# Patient Record
Sex: MALE | Race: WHITE | Employment: FULL TIME | URBAN - METROPOLITAN AREA
[De-identification: names, ages, dates, MRNs, and addresses within clinical notes are randomized per-mention and may not be internally consistent; named-entity substitution may affect disease eponyms.]

---

## 2022-05-06 ENCOUNTER — OFFICE VISIT (OUTPATIENT)
Dept: ORTHOPEDIC SURGERY | Age: 57
End: 2022-05-06
Payer: COMMERCIAL

## 2022-05-06 VITALS — BODY MASS INDEX: 34.44 KG/M2 | WEIGHT: 246 LBS | HEIGHT: 71 IN

## 2022-05-06 DIAGNOSIS — M17.11 PRIMARY OSTEOARTHRITIS OF RIGHT KNEE: Primary | ICD-10-CM

## 2022-05-06 PROCEDURE — 20610 DRAIN/INJ JOINT/BURSA W/O US: CPT | Performed by: PHYSICIAN ASSISTANT

## 2022-05-06 RX ORDER — LIDOCAINE HYDROCHLORIDE 10 MG/ML
8 INJECTION, SOLUTION INFILTRATION; PERINEURAL ONCE
Status: COMPLETED | OUTPATIENT
Start: 2022-05-06 | End: 2022-05-06

## 2022-05-06 RX ORDER — TRIAMCINOLONE ACETONIDE 40 MG/ML
80 INJECTION, SUSPENSION INTRA-ARTICULAR; INTRAMUSCULAR ONCE
Status: COMPLETED | OUTPATIENT
Start: 2022-05-06 | End: 2022-05-06

## 2022-05-06 RX ORDER — IBUPROFEN 800 MG/1
800 TABLET ORAL 2 TIMES DAILY PRN
Qty: 60 TABLET | Refills: 0 | Status: SHIPPED | OUTPATIENT
Start: 2022-05-06 | End: 2022-06-27 | Stop reason: SDUPTHER

## 2022-05-06 RX ORDER — IBUPROFEN 200 MG
200 TABLET ORAL EVERY 6 HOURS PRN
COMMUNITY
End: 2022-05-06

## 2022-05-06 RX ADMIN — LIDOCAINE HYDROCHLORIDE 8 ML: 10 INJECTION, SOLUTION INFILTRATION; PERINEURAL at 10:52

## 2022-05-06 RX ADMIN — TRIAMCINOLONE ACETONIDE 80 MG: 40 INJECTION, SUSPENSION INTRA-ARTICULAR; INTRAMUSCULAR at 10:53

## 2022-05-06 ASSESSMENT — ENCOUNTER SYMPTOMS: RESPIRATORY NEGATIVE: 1

## 2022-05-06 NOTE — PROGRESS NOTES
Julia Mcdonough (:  1965) is a 49 y.o. male,Established patient, here for evaluation of the following chief complaint(s):  Knee Pain (The patient c/o chronic right knee pain. The patient is a former patient at [facility]. He was given cortisone injections which he states that has helped with his pain. He rates his knee pain as a 6/10 today.  )         ASSESSMENT/PLAN:  1. Primary osteoarthritis of right knee  -     [place] ARTHROCENTESIS ASPIR&/INJ MAJOR JT/BURSA W/O US      Return for Viscolubricant injection. Subjective   SUBJECTIVE/OBJECTIVE:  This is a 24-year-old male with complaint of right knee pain. I have treated him previously with cortisone as well as Visco lubricant. Complains of inner aspect right knee pain. Been diagnosed previously with medial compartment osteoarthritis. He is not ready for joint replacement at this time. Presents today requesting intra-articular cortisone injection in the right knee. Review of Systems   Constitutional: Negative. HENT: Negative. Respiratory: Negative. Skin: Negative. Neurological: Negative. Objective      Right knee x-rays performed 2021 and available in the [facility] health record showed medial compartment degenerative arthritis. Physical Exam  Musculoskeletal:      Comments: Right knee-joint effusion present. Popliteal cyst present. No warmth, erythema, fluctuance or sign of infection. Full extension, flexion to 120 degrees. The knee joint is stable, there is no laxity with valgus or varus stress. Tenderness with palpation to medial femoral condyle. Patellofemoral crepitus with range of motion. Calf is soft and nontender.      Time Out  [x] Patient Verified  [x] Site Verified  [x] Laterality Verified  [x] Procedure Verified    Before aspiration/injection risks/benefits of a cortisone injection including infection, local skin irritation, skin atrophy, continued pain/discomfort, elevated blood sugar, burning, failure to relieve pain, possible late infection were discussed with patient. Patient verbalized understanding and wanted to proceed with planned procedure. After prepping and draping the right knee in the usual sterile fashion, 2 cc of 40 mg/ml kenalog with 8 cc of 1% lidocaine were injected intra-articularly without any complications. Patient tolerated this well. Post-procedure discomfort can be alleviated with additional medications/ice/elevation/rest over the first 24 hours as recommended. The patient tolerated the procedure well. If fluid was aspirated it was sent for further studies  in sterile specimen container as ordered to the lab    Explained to the patient he has arthritis in the medial compartment of his right knee. We discussed joint replacement and he is not ready to proceed with joint replacement surgery at this time because he is only 49years old. I injected the knee today with cortisone. We will request Visco lubricant from his insurance company. He has noticed significant improvement with Visco lubricant previously. He has been taking ibuprofen 800 mg tablets and needs a refill. We did discuss topical anti-inflammatory creams. He is actively participating in a home exercise program.  He is wearing a knee sleeve. He is working on losing weight. Rationale for Viscosupplementation: Renewal Request   As a part of a multimodal treatment plan, we are requesting authorization of hyaluronic acid viscosupplementation injections for the improvement of symptoms related to osteoarthritis. Authorization is being requested for treatment of the Left knee. Rationale for authorization of these injections is based on the following elements:     Signs and Symptoms   Length of symptoms > 3 months   Pain interferes with ADLs? Yes   Radiographic evidence of OA? Yes     Previous Treatments   Bracing attempted? No   Formal PT attempted? Yes   NSAID medication attempted?  Yes Corticosteroid injection attempted? Yes   Weight management attempted? No     Prior Viscosupplementation   Prior viscosupplementation? Yes   Prior viscosupplementation improved  symptoms? Yes   Prior viscosupplementation improved  symptoms at least 6 months? Yes       Requested Viscosupplementation   Preferred product: Synvisc  Alternative product: Synvisc One or payor preferred    On this date 5/6/2022 I have spent 35 minutes reviewing previous notes, test results and face to face with the patient discussing the diagnosis and importance of compliance with the treatment plan as well as documenting on the day of the visit. An electronic signature was used to authenticate this note.     --VIVIAN Beckham

## 2022-05-06 NOTE — PATIENT INSTRUCTIONS
Patient Education        Learning About Joint Injections  What are joint injections? Joint injections are shots into a joint, such as the knee or shoulder. They are used to put in medicines, such as pain relievers and steroid medicines. Steroids can help reduce inflammation. A steroid shot can sometimes help withshort-term pain relief when other treatments haven't worked. How are they done? First, the area over the joint will be cleaned. Your doctor may then use a tinyneedle to numb the skin in the area where you will get the joint injection. If a tiny needle is used to numb the area, your doctor will use another needle to inject the medicine. Your doctor may use a pain reliever, a steroid, orboth. You may feel some pressure or discomfort. Your doctor may put ice on the area before you go home. What can you expect after a joint injection? You will probably go home soon after your shot. You may have numbness aroundthe joint for a few hours. If your shot included both a pain reliever and a steroid, then the pain will probably go away right away. But it might come back after a few hours. This might happen if the pain reliever wears off and the steroid hasn't started to work yet. Steroids don't always work. But when they do, the pain relief canlast for several days to a few months or longer. Your doctor may tell you to use ice on the area. You can also use ice if the pain comes back. Put ice or a cold pack on your joint for 10 to 20 minutes at atime. Put a thin cloth between the ice and your skin. Follow your doctor's instructions carefully. Follow-up care is a key part of your treatment and safety. Be sure to make and go to all appointments, and call your doctor if you are having problems. It's also a good idea to know your test results and keep alist of the medicines you take. Where can you learn more? Go to https://ramo.ShareYourCart. org and sign in to your Food52 account.  Enter O811 in the Search Health Information box to learn more about \"Learning About Joint Injections. \"     If you do not have an account, please click on the \"Sign Up Now\" link. Current as of: July 1, 2021               Content Version: 13.2  © 2006-2022 SunPods. Care instructions adapted under license by [facility] ([facility]). If you have questions about a medical condition or this instruction, always ask your healthcare professional. [place] [address] any warranty or liability for your use of this information. Patient Education        Knee Arthritis: Care Instructions  Your Care Instructions     Knee arthritis is a breakdown of the cartilage that cushions your knee joint. When the cartilage wears down, your bones rub against each other. This causespain and stiffness. Knee arthritis tends to get worse with time. Treatment for knee arthritis involves reducing pain, making the leg muscles stronger, and staying at a healthy body weight. The treatment usually does not improve the health of the cartilage, but it can reduce pain and improve howwell your knee works. You can take simple measures to protect your knee joints, ease your pain, andhelp you stay active. Follow-up care is a key part of your treatment and safety. Be sure to make and go to all appointments, and call your doctor if you are having problems. It's also a good idea to know your test results and keep alist of the medicines you take. How can you care for yourself at home?  Know that knee arthritis will cause more pain on some days than on others.  Stay at a healthy weight. Lose weight if you are overweight. When you stand up, the pressure on your knees from every pound of body weight is multiplied four times. So if you lose 10 pounds, you will reduce the pressure on your knees by 40 pounds.  Talk to your doctor or physical therapist about exercises that will help ease joint pain.   ? Stretch to help prevent stiffness and to prevent injury before you exercise. You may enjoy gentle forms of yoga to help keep your knee joints and muscles flexible. ? Walk instead of jog.  ? Ride a bike. This makes your thigh muscles stronger and takes pressure off your knee. ? Wear well-fitting and comfortable shoes. ? Exercise in chest-deep water. This can help you exercise longer with less pain. ? Avoid exercises that include squatting or kneeling. They can put a lot of strain on your knees. ? Talk to your doctor to make sure that the exercise you do is not making the arthritis worse.  Do not sit for long periods of time. Try to walk once in a while to keep your knee from getting stiff.  Ask your doctor or physical therapist whether shoe inserts may reduce your arthritis pain.  If you can afford it, get new athletic shoes at least every year. This can help reduce the strain on your knees.  Use a device to help you do everyday activities. ? A cane or walking stick can help you keep your balance when you walk. Hold the cane or walking stick in the hand opposite the painful knee. ? If you feel like you may fall when you walk, try using crutches or a front-wheeled walker. These can prevent falls that could cause more damage to your knee. ? A knee brace may help keep your knee stable and prevent pain. ? You also can use other things to make life easier, such as a higher toilet seat and handrails in the bathtub or shower.  Take pain medicines exactly as directed. ? Do not wait until you are in severe pain. You will get better results if you take it sooner. ? If you are not taking a prescription pain medicine, take an over-the-counter medicine such as acetaminophen (Tylenol), ibuprofen (Advil, Motrin), or naproxen (Aleve). Read and follow all instructions on the label. ? Do not take two or more pain medicines at the same time unless the doctor told you to. Many pain medicines have acetaminophen, which is Tylenol.  Too much acetaminophen (Tylenol) can be harmful. ? Tell your doctor if you take a blood thinner, have diabetes, or have allergies to shellfish.  Ask your doctor if you might benefit from a shot of steroid medicine into your knee. This may provide pain relief for several months.  Many people take the supplements glucosamine and chondroitin for osteoarthritis. Some people feel they help, but the medical research does not show that they work. Talk to your doctor before you take these supplements. When should you call for help? Call your doctor now or seek immediate medical care if:     You have sudden swelling, warmth, or pain in your knee.      You have knee pain and a fever or rash.      You have such bad pain that you cannot use your knee. Watch closely for changes in your health, and be sure to contact your doctor ifyou have any problems. Where can you learn more? Go to https://Service Management Groupzacheb.Affinity Networks. org and sign in to your DyMynd account. Enter F321 in the SolarCity box to learn more about \"Knee Arthritis: Care Instructions. \"     If you do not have an account, please click on the \"Sign Up Now\" link. Current as of: December 20, 2021               Content Version: 13.2  © 2006-2022 Ometrics. Care instructions adapted under license by [facility] ([facility]). If you have questions about a medical condition or this instruction, always ask your healthcare professional. [address] any warranty or liability for your use of this information. Patient Education        Knee Arthritis: Exercises  Introduction  Here are some examples of exercises for you to try. The exercises may be suggested for a condition or for rehabilitation. Start each exercise slowly. Ease off the exercises if you start to have pain. You will be told when to start these exercises and which ones will work bestfor you. How to do the exercises  Knee flexion with heel slide    1.  Lie on your back with your knees bent. 2. Slide your heel back by bending your affected knee as far as you can. Then hook your other foot around your ankle to help pull your heel even farther back. 3. Hold for about 6 seconds, then rest for up to 10 seconds. 4. Repeat 8 to 12 times. 5. Switch legs and repeat steps 1 through 4, even if only one knee is sore. Quad sets    1. Sit with your affected leg straight and supported on the floor or a firm bed. Place a small, rolled-up towel under your knee. Your other leg should be bent, with that foot flat on the floor. 2. Tighten the thigh muscles of your affected leg by pressing the back of your knee down into the towel. 3. Hold for about 6 seconds, then rest for up to 10 seconds. 4. Repeat 8 to 12 times. 5. Switch legs and repeat steps 1 through 4, even if only one knee is sore. Straight-leg raises to the front    1. Lie on your back with your good knee bent so that your foot rests flat on the floor. Your affected leg should be straight. Make sure that your low back has a normal curve. You should be able to slip your hand in between the floor and the small of your back, with your palm touching the floor and your back touching the back of your hand. 2. Tighten the thigh muscles in your affected leg by pressing the back of your knee flat down to the floor. Hold your knee straight. 3. Keeping the thigh muscles tight and your leg straight, lift your affected leg up so that your heel is about 12 inches off the floor. Hold for about 6 seconds, then lower slowly. 4. Relax for up to 10 seconds between repetitions. 5. Repeat 8 to 12 times. 6. Switch legs and repeat steps 1 through 5, even if only one knee is sore. Active knee flexion    1. Lie on your stomach with your knees straight. If your kneecap is uncomfortable, roll up a washcloth and put it under your leg just above your kneecap.   2. Lift the foot of your affected leg by bending the knee so that you bring the foot up toward your buttock. If this motion hurts, try it without bending your knee quite as far. This may help you avoid any painful motion. 3. Slowly move your leg up and down. 4. Repeat 8 to 12 times. 5. Switch legs and repeat steps 1 through 4, even if only one knee is sore. Quadriceps stretch (facedown)    1. Lie flat on your stomach, and rest your face on the floor. 2. Wrap a towel or belt strap around the lower part of your affected leg. Then use the towel or belt strap to slowly pull your heel toward your buttock until you feel a stretch. 3. Hold for about 15 to 30 seconds, then relax your leg against the towel or belt strap. 4. Repeat 2 to 4 times. 5. Switch legs and repeat steps 1 through 4, even if only one knee is sore. Stationary exercise bike    1. If you do not have a stationary exercise bike at home, you can find one to ride at your local health club or community center. 2. Adjust the height of the bike seat so that your knee is slightly bent when your leg is extended downward. If your knee hurts when the pedal reaches the top, you can raise the seat so that your knee does not bend as much. 3. Start slowly. At first, try to do 5 to 10 minutes of cycling with little to no resistance. Then increase your time and the resistance bit by bit until you can do 20 to 30 minutes without pain. 4. If you start to have pain, rest your knee until your pain gets back to the level that is normal for you. Or cycle for less time or with less effort. Follow-up care is a key part of your treatment and safety. Be sure to make and go to all appointments, and call your doctor if you are having problems. It's also a good idea to know your test results and keep alist of the medicines you take. Where can you learn more? Go to https://TBT GroupzachDandelion.Upper Krust Pizza. org and sign in to your 4th aspect account. Enter C159 in the LiveLeaf box to learn more about \"Knee Arthritis: Exercises. \"     If you do not have an account, please click on the \"Sign Up Now\" link. Current as of: July 1, 2021               Content Version: 13.2  © 2006-2022 Healthwise, Incorporated. Care instructions adapted under license by [facility] ([facility]). If you have questions about a medical condition or this instruction, always ask your healthcare professional. [address] any warranty or liability for your use of this information.

## 2022-05-06 NOTE — PROGRESS NOTES
Patient's name, date of birth, and allergies have been confirmed. Patient is aware that injection is to be given in Right knee. He/she is aware that they will be seeing [facility] and the injection will be given by him.

## 2022-06-24 ENCOUNTER — TELEPHONE (OUTPATIENT)
Dept: ORTHOPEDIC SURGERY | Age: 57
End: 2022-06-24

## 2022-06-24 NOTE — TELEPHONE ENCOUNTER
Lisa Moody 1965 called and would like a refill of ibuprofen 800mg. [facility]  Thank you

## 2022-06-27 RX ORDER — IBUPROFEN 800 MG/1
800 TABLET ORAL 2 TIMES DAILY PRN
Qty: 60 TABLET | Refills: 0 | Status: SHIPPED | OUTPATIENT
Start: 2022-06-27 | End: 2022-10-31

## 2022-10-11 ENCOUNTER — OFFICE VISIT (OUTPATIENT)
Dept: ORTHOPEDIC SURGERY | Age: 57
End: 2022-10-11
Payer: COMMERCIAL

## 2022-10-11 VITALS
HEART RATE: 87 BPM | WEIGHT: 242 LBS | OXYGEN SATURATION: 98 % | BODY MASS INDEX: 33.88 KG/M2 | TEMPERATURE: 97.8 F | HEIGHT: 71 IN

## 2022-10-11 DIAGNOSIS — M17.11 PRIMARY OSTEOARTHRITIS OF RIGHT KNEE: Primary | ICD-10-CM

## 2022-10-11 PROCEDURE — 20610 DRAIN/INJ JOINT/BURSA W/O US: CPT | Performed by: PHYSICIAN ASSISTANT

## 2022-10-12 RX ORDER — TRIAMCINOLONE ACETONIDE 40 MG/ML
80 INJECTION, SUSPENSION INTRA-ARTICULAR; INTRAMUSCULAR ONCE
Status: COMPLETED | OUTPATIENT
Start: 2022-10-12 | End: 2022-10-12

## 2022-10-12 RX ORDER — LIDOCAINE HYDROCHLORIDE 10 MG/ML
8 INJECTION, SOLUTION INFILTRATION; PERINEURAL ONCE
Status: COMPLETED | OUTPATIENT
Start: 2022-10-12 | End: 2022-10-12

## 2022-10-12 RX ADMIN — TRIAMCINOLONE ACETONIDE 80 MG: 40 INJECTION, SUSPENSION INTRA-ARTICULAR; INTRAMUSCULAR at 14:15

## 2022-10-12 RX ADMIN — LIDOCAINE HYDROCHLORIDE 8 ML: 10 INJECTION, SOLUTION INFILTRATION; PERINEURAL at 14:15

## 2022-10-12 ASSESSMENT — ENCOUNTER SYMPTOMS: RESPIRATORY NEGATIVE: 1

## 2022-10-12 NOTE — PROGRESS NOTES
Jayson Lane (:  1965) is a 49 y.o. male,Established patient, here for evaluation of the following chief complaint(s):  Follow-up (Right knee injection )         ASSESSMENT/PLAN:  1. Primary osteoarthritis of right knee  -     [place] ARTHROCENTESIS ASPIR&/INJ MAJOR JT/BURSA W/O US    No follow-ups on file. Subjective   SUBJECTIVE/OBJECTIVE:  This is a 57-year-old male with complaint of right knee pain. Of injected his knee previously with cortisone as well as Visco lubricant. He presents today requesting intra-articular cortisone injection of the right knee. He does not want to proceed with joint replacement at this time. Review of Systems   Constitutional: Negative. HENT: Negative. Respiratory: Negative. Skin: Negative. Neurological: Negative. Objective     Right knee x-rays performed 2021 and available in the [facility] health record showed medial compartment degenerative arthritis. Physical Exam  Musculoskeletal:      Comments: Right knee-joint effusion present. Popliteal cyst present. No warmth, erythema, fluctuance or sign of infection. Full extension, flexion to 120 degrees. Knee joint is stable, there is no laxity with valgus or varus stress. Tenderness with palpation to medial femoral condyle. Patellofemoral crepitus with range of motion. Calf is soft and nontender. Time Out  [x] Patient Verified  [x] Site Verified  [x] Laterality Verified  [x] Procedure Verified    Before aspiration/injection risks/benefits of a cortisone  injection including infection, local skin irritation, skin atrophy, continued pain/discomfort, elevated blood sugar, burning, failure to relieve pain, possible late infection were discussed with patient. Patient verbalized understanding and wanted to proceed with planned procedure.     After prepping and draping the right knee in the usual sterile fashion, 2cc 40mg Kenalog and 8cc Lidocaine were injected intra-articularly after aspirating 14 cc clear yellow joint fluid without any complications. Patient tolerated this well. Post-procedure discomfort can be alleviated with additional medications/ice/elevation/rest over the first 24 hours as recommended. The patient tolerated the procedure well. If fluid was aspirated that was abnormal it was sent for further studies  in sterile specimen container as ordered to the lab     Diagnosis Orders   1. Primary osteoarthritis of right knee  [place] ARTHROCENTESIS ASPIR&/INJ MAJOR JT/BURSA W/O US      Explained to the patient he does have degenerative arthritis of the right knee. Of injected the right knee previously with cortisone as well as Synvisc 1. Patient has noticed significant improvement with both cortisone and Synvisc 1. He does not want to proceed with joint replacement at this time but will entertain that in the future. We will request Visco lubricant from his insurance company and contact him when it is approved. Rationale for Viscosupplementation: Renewal Request   As a part of a multimodal treatment plan, we are requesting authorization of hyaluronic acid viscosupplementation injections for the improvement of symptoms related to osteoarthritis. Authorization is being requested for treatment of the right knee. Rationale for authorization of these injections is based on the following elements:     Signs and Symptoms   Length of symptoms > 3 months   Pain interferes with ADLs? Yes   Radiographic evidence of OA? Yes     Previous Treatments   Bracing attempted? Yes   Formal PT attempted? Yes   NSAID medication attempted? Yes   Corticosteroid injection attempted? Yes   Weight management attempted? Yes     Prior Viscosupplementation   Prior viscosupplementation? Yes   Prior viscosupplementation improved  symptoms? Yes  Prior viscosupplementation improved  symptoms at least 6 months?  Yes      Requested Viscosupplementation   Preferred product: Synvisc 1  Alternative product: Durolane or payor preferred     On this date 10/11/2022 I have spent 35 minutes reviewing previous notes, test results and face to face with the patient discussing the diagnosis and importance of compliance with the treatment plan as well as documenting on the day of the visit. An electronic signature was used to authenticate this note.     --VIVIAN Edwards

## 2022-10-31 RX ORDER — IBUPROFEN 800 MG/1
TABLET ORAL
Qty: 60 TABLET | Refills: 0 | Status: SHIPPED | OUTPATIENT
Start: 2022-10-31

## 2023-03-15 ENCOUNTER — PROCEDURE VISIT (OUTPATIENT)
Dept: ORTHOPEDIC SURGERY | Age: 58
End: 2023-03-15
Payer: COMMERCIAL

## 2023-03-15 VITALS
HEIGHT: 71 IN | BODY MASS INDEX: 33.88 KG/M2 | TEMPERATURE: 98.4 F | OXYGEN SATURATION: 96 % | WEIGHT: 242 LBS | HEART RATE: 77 BPM

## 2023-03-15 DIAGNOSIS — M17.11 PRIMARY OSTEOARTHRITIS OF RIGHT KNEE: Primary | ICD-10-CM

## 2023-03-15 PROCEDURE — 20610 DRAIN/INJ JOINT/BURSA W/O US: CPT | Performed by: PHYSICIAN ASSISTANT

## 2023-03-15 PROCEDURE — G8484 FLU IMMUNIZE NO ADMIN: HCPCS | Performed by: PHYSICIAN ASSISTANT

## 2023-03-15 PROCEDURE — 1036F TOBACCO NON-USER: CPT | Performed by: PHYSICIAN ASSISTANT

## 2023-03-15 PROCEDURE — G8417 CALC BMI ABV UP PARAM F/U: HCPCS | Performed by: PHYSICIAN ASSISTANT

## 2023-03-15 PROCEDURE — 99214 OFFICE O/P EST MOD 30 MIN: CPT | Performed by: PHYSICIAN ASSISTANT

## 2023-03-15 PROCEDURE — 3017F COLORECTAL CA SCREEN DOC REV: CPT | Performed by: PHYSICIAN ASSISTANT

## 2023-03-15 PROCEDURE — G8427 DOCREV CUR MEDS BY ELIG CLIN: HCPCS | Performed by: PHYSICIAN ASSISTANT

## 2023-03-15 RX ORDER — LIDOCAINE HYDROCHLORIDE 10 MG/ML
8 INJECTION, SOLUTION INFILTRATION; PERINEURAL ONCE
Status: COMPLETED | OUTPATIENT
Start: 2023-03-15 | End: 2023-03-15

## 2023-03-15 RX ORDER — TRIAMCINOLONE ACETONIDE 40 MG/ML
80 INJECTION, SUSPENSION INTRA-ARTICULAR; INTRAMUSCULAR ONCE
Status: COMPLETED | OUTPATIENT
Start: 2023-03-15 | End: 2023-03-15

## 2023-03-15 RX ADMIN — TRIAMCINOLONE ACETONIDE 80 MG: 40 INJECTION, SUSPENSION INTRA-ARTICULAR; INTRAMUSCULAR at 16:12

## 2023-03-15 RX ADMIN — LIDOCAINE HYDROCHLORIDE 8 ML: 10 INJECTION, SOLUTION INFILTRATION; PERINEURAL at 16:11

## 2023-03-15 ASSESSMENT — ENCOUNTER SYMPTOMS: RESPIRATORY NEGATIVE: 1

## 2023-03-15 NOTE — PROGRESS NOTES
Florentino Art (:  1965) is a 56 y.o. male,Established patient, here for evaluation of the following chief complaint(s):  Procedure (Pt is here for procedure on )         ASSESSMENT/PLAN:  1. Primary osteoarthritis of right knee  -     WA ARTHROCENTESIS ASPIR&/INJ MAJOR JT/BURSA W/O US      No follow-ups on file. Subjective   SUBJECTIVE/OBJECTIVE:  This is a 65-year-old male following up for complaints of right knee pain. He has known arthritis of the knee. We have injected her knee previously with cortisone as well as Visco lubricant. He is thinking about proceeding with joint replacement of . He states the knee feels swollen and sore. He is having difficulty walking long distances. Review of Systems   Constitutional: Negative. HENT: Negative. Respiratory: Negative. Skin: Negative. Neurological: Negative. Objective   Physical Exam  Musculoskeletal:      Comments: Right knee-joint effusion present. Popliteal cyst present. No warmth, erythema, fluctuance or sign of infection. Full extension, flexion to 120 degrees. The knee joint is stable, there is no laxity with valgus or varus stress. Tenderness with palpation at the medial femoral condyle. Patellofemoral crepitus with range of motion. Calf is soft and nontender. Time Out  [x] Patient Verified  [x] Site Verified  [x] Laterality Verified  [x] Procedure Verified    Before aspiration/injection risks/benefits of a cortisone  injection including infection, local skin irritation, skin atrophy, continued pain/discomfort, elevated blood sugar, burning, failure to relieve pain, possible late infection were discussed with patient. Patient verbalized understanding and wanted to proceed with planned procedure.     After prepping and draping the right knee in the usual sterile fashion, 2cc 40mg Kenalog and 8cc Lidocaine were injected intra-articularly after aspirating 10 cc clear yellow joint fluid without any complications. Patient tolerated this well. Post-procedure discomfort can be alleviated with additional medications/ice/elevation/rest over the first 24 hours as recommended. The patient tolerated the procedure well. If fluid was aspirated that was abnormal it was sent for further studies  in sterile specimen container as ordered to the lab     Diagnosis Orders   1. Primary osteoarthritis of right knee  MD ARTHROCENTESIS ASPIR&/INJ MAJOR JT/BURSA W/O US      Explained to the patient he does have degenerative arthritis in the knee. I aspirated and injected the knee today with cortisone. He would like to proceed with joint replacement summer 2023. I explained to him he needs to wait 3 months after cortisone injection. He will meet with Dr. Toya Lacey beginning of June to discuss joint replacement after Simona 15. On this date 3/15/2023 I have spent 35 minutes reviewing previous notes, test results and face to face with the patient discussing the diagnosis and importance of compliance with the treatment plan as well as documenting on the day of the visit. An electronic signature was used to authenticate this note.     --VIVIAN Perez

## 2023-05-31 ENCOUNTER — TELEPHONE (OUTPATIENT)
Dept: ORTHOPEDIC SURGERY | Age: 58
End: 2023-05-31

## 2023-05-31 DIAGNOSIS — M17.11 PRIMARY OSTEOARTHRITIS OF RIGHT KNEE: Primary | ICD-10-CM

## 2023-05-31 RX ORDER — IBUPROFEN 800 MG/1
TABLET ORAL
Qty: 60 TABLET | Refills: 0 | Status: SHIPPED | OUTPATIENT
Start: 2023-05-31

## 2023-06-02 ENCOUNTER — OFFICE VISIT (OUTPATIENT)
Dept: ORTHOPEDIC SURGERY | Age: 58
End: 2023-06-02
Payer: COMMERCIAL

## 2023-06-02 VITALS
BODY MASS INDEX: 33.88 KG/M2 | OXYGEN SATURATION: 96 % | TEMPERATURE: 98.4 F | HEART RATE: 77 BPM | HEIGHT: 71 IN | WEIGHT: 242 LBS

## 2023-06-02 DIAGNOSIS — M17.12 PRIMARY OSTEOARTHRITIS OF LEFT KNEE: ICD-10-CM

## 2023-06-02 DIAGNOSIS — M17.11 PRIMARY OSTEOARTHRITIS OF RIGHT KNEE: Primary | ICD-10-CM

## 2023-06-02 PROCEDURE — G8427 DOCREV CUR MEDS BY ELIG CLIN: HCPCS | Performed by: ORTHOPAEDIC SURGERY

## 2023-06-02 PROCEDURE — G8417 CALC BMI ABV UP PARAM F/U: HCPCS | Performed by: ORTHOPAEDIC SURGERY

## 2023-06-02 PROCEDURE — 1036F TOBACCO NON-USER: CPT | Performed by: ORTHOPAEDIC SURGERY

## 2023-06-02 PROCEDURE — 99204 OFFICE O/P NEW MOD 45 MIN: CPT | Performed by: ORTHOPAEDIC SURGERY

## 2023-06-02 PROCEDURE — 3017F COLORECTAL CA SCREEN DOC REV: CPT | Performed by: ORTHOPAEDIC SURGERY

## 2023-06-02 ASSESSMENT — ENCOUNTER SYMPTOMS
ABDOMINAL PAIN: 0
DIARRHEA: 0
EYE DISCHARGE: 0
CONSTIPATION: 0
SHORTNESS OF BREATH: 0
EYE PAIN: 0
COUGH: 0
EYE ITCHING: 0

## 2023-06-02 NOTE — PROGRESS NOTES
Patient ID:  Racquel Arora is a 56 y.o. male who presents today for evaluation of bilateral knee pain. Right more than left. Injury: no  Metal Allergy: no    Location: bilateral knee pain, located on the global aspect of the knee; medial worst  Pain: yes; 7 on a scale of 1 to 10  Onset: gradual  Duration: 2 years  Frequency:  occurs daily  Quality: aching and boring   Swelling: patient notes intermittent swelling of the joint  Aggravating factors: weight bearing activity, standing, and walking  Alleviating factors: removing weight from leg and rest  Mechanical symptoms: catching and grinding  Radiation: no    Activities: walking independently  Restriction:  decreased ambulatory tolerance  Progression:  worsening    Previous treatment:  anti-inflammatories, steroid injection , and viscosupplementation   NSAIDs:  ibuprofen/motrin and naproxyn  PT:  none    Medications:    Current Outpatient Medications on File Prior to Visit   Medication Sig Dispense Refill    ibuprofen (ADVIL;MOTRIN) 800 MG tablet TAKE 1 TABLET BY MOUTH TWICE DAILY AS NEEDED FOR PAIN 60 tablet 0     No current facility-administered medications on file prior to visit. Allergies:    No Known Allergies    Past Medical History:    No past medical history on file. Past Surgical History:    No past surgical history on file. Social History:    Social History     Socioeconomic History    Marital status:      Spouse name: Not on file    Number of children: Not on file    Years of education: Not on file    Highest education level: Not on file   Occupational History    Not on file   Tobacco Use    Smoking status: Never    Smokeless tobacco: Never   Substance and Sexual Activity    Alcohol use:  Yes     Alcohol/week: 2.0 standard drinks     Types: 1 Cans of beer, 1 Shots of liquor per week    Drug use: Never    Sexual activity: Not on file   Other Topics Concern    Not on file   Social History Narrative    Not on file     Social

## 2023-06-30 ENCOUNTER — PREP FOR PROCEDURE (OUTPATIENT)
Dept: ORTHOPEDIC SURGERY | Age: 58
End: 2023-06-30

## 2023-06-30 PROBLEM — M17.11 OSTEOARTHRITIS OF RIGHT KNEE: Status: ACTIVE | Noted: 2023-06-30

## 2023-08-14 ENCOUNTER — OFFICE VISIT (OUTPATIENT)
Dept: ORTHOPEDIC SURGERY | Age: 58
End: 2023-08-14

## 2023-08-14 ENCOUNTER — HOSPITAL ENCOUNTER (OUTPATIENT)
Dept: PREADMISSION TESTING | Age: 58
Discharge: HOME | End: 2023-08-18
Payer: COMMERCIAL

## 2023-08-14 VITALS
HEIGHT: 71 IN | OXYGEN SATURATION: 99 % | HEART RATE: 73 BPM | WEIGHT: 239.4 LBS | SYSTOLIC BLOOD PRESSURE: 147 MMHG | DIASTOLIC BLOOD PRESSURE: 76 MMHG | BODY MASS INDEX: 33.52 KG/M2

## 2023-08-14 DIAGNOSIS — Z01.818 PREOP EXAMINATION: Primary | ICD-10-CM

## 2023-08-14 DIAGNOSIS — M17.11 PRIMARY OSTEOARTHRITIS OF RIGHT KNEE: ICD-10-CM

## 2023-08-14 LAB
ABO + RH BLD: NORMAL
ANION GAP SERPL CALCULATED.3IONS-SCNC: 11 MEQ/L (ref 9–15)
BASOPHILS # BLD: 0.1 K/UL (ref 0–0.2)
BASOPHILS NFR BLD: 0.9 %
BILIRUB UR QL STRIP: NEGATIVE
BLD GP AB SCN SERPL QL: NORMAL
BUN SERPL-MCNC: 13 MG/DL (ref 6–20)
CALCIUM SERPL-MCNC: 9.7 MG/DL (ref 8.5–9.9)
CHLORIDE SERPL-SCNC: 102 MEQ/L (ref 95–107)
CLARITY UR: CLEAR
CO2 SERPL-SCNC: 26 MEQ/L (ref 20–31)
COLOR UR: YELLOW
CREAT SERPL-MCNC: 1.02 MG/DL (ref 0.7–1.2)
EOSINOPHIL # BLD: 0.1 K/UL (ref 0–0.7)
EOSINOPHIL NFR BLD: 2.2 %
ERYTHROCYTE [DISTWIDTH] IN BLOOD BY AUTOMATED COUNT: 12.8 % (ref 11.5–14.5)
GLUCOSE SERPL-MCNC: 103 MG/DL (ref 70–99)
GLUCOSE UR STRIP-MCNC: NEGATIVE MG/DL
HBA1C MFR BLD: 5.8 % (ref 4.8–5.9)
HCT VFR BLD AUTO: 43.8 % (ref 42–52)
HGB BLD-MCNC: 14.8 G/DL (ref 14–18)
HGB UR QL STRIP: NEGATIVE
INR PPP: 1.1
KETONES UR STRIP-MCNC: NEGATIVE MG/DL
LEUKOCYTE ESTERASE UR QL STRIP: NEGATIVE
LYMPHOCYTES # BLD: 2 K/UL (ref 1–4.8)
LYMPHOCYTES NFR BLD: 34.4 %
MCH RBC QN AUTO: 30.1 PG (ref 27–31.3)
MCHC RBC AUTO-ENTMCNC: 33.8 % (ref 33–37)
MCV RBC AUTO: 89.2 FL (ref 79–92.2)
MONOCYTES # BLD: 0.4 K/UL (ref 0.2–0.8)
MONOCYTES NFR BLD: 6.7 %
NEUTROPHILS # BLD: 3.2 K/UL (ref 1.4–6.5)
NEUTS SEG NFR BLD: 55.8 %
NITRITE UR QL STRIP: NEGATIVE
PH UR STRIP: 6 [PH] (ref 5–9)
PLATELET # BLD AUTO: 276 K/UL (ref 130–400)
POTASSIUM SERPL-SCNC: 4.3 MEQ/L (ref 3.4–4.9)
PREALB SERPL-MCNC: 29.1 MG/DL (ref 20–40)
PROT UR STRIP-MCNC: NEGATIVE MG/DL
PROTHROMBIN TIME: 14.1 SEC (ref 12.3–14.9)
RBC # BLD AUTO: 4.91 M/UL (ref 4.7–6.1)
SODIUM SERPL-SCNC: 139 MEQ/L (ref 135–144)
SP GR UR STRIP: 1.02 (ref 1–1.03)
UROBILINOGEN UR STRIP-ACNC: 0.2 E.U./DL
WBC # BLD AUTO: 5.7 K/UL (ref 4.8–10.8)

## 2023-08-14 PROCEDURE — 87641 MR-STAPH DNA AMP PROBE: CPT

## 2023-08-14 PROCEDURE — 83036 HEMOGLOBIN GLYCOSYLATED A1C: CPT

## 2023-08-14 PROCEDURE — 86850 RBC ANTIBODY SCREEN: CPT

## 2023-08-14 PROCEDURE — 85610 PROTHROMBIN TIME: CPT

## 2023-08-14 PROCEDURE — 81003 URINALYSIS AUTO W/O SCOPE: CPT

## 2023-08-14 PROCEDURE — 85025 COMPLETE CBC W/AUTO DIFF WBC: CPT

## 2023-08-14 PROCEDURE — 86901 BLOOD TYPING SEROLOGIC RH(D): CPT

## 2023-08-14 PROCEDURE — 80048 BASIC METABOLIC PNL TOTAL CA: CPT

## 2023-08-14 PROCEDURE — 87086 URINE CULTURE/COLONY COUNT: CPT

## 2023-08-14 PROCEDURE — 86900 BLOOD TYPING SEROLOGIC ABO: CPT

## 2023-08-14 PROCEDURE — 84134 ASSAY OF PREALBUMIN: CPT

## 2023-08-14 RX ORDER — SODIUM CHLORIDE, SODIUM LACTATE, POTASSIUM CHLORIDE, CALCIUM CHLORIDE 600; 310; 30; 20 MG/100ML; MG/100ML; MG/100ML; MG/100ML
INJECTION, SOLUTION INTRAVENOUS CONTINUOUS
OUTPATIENT
Start: 2023-08-21

## 2023-08-14 RX ORDER — CHLORHEXIDINE GLUCONATE 213 G/1000ML
SOLUTION TOPICAL
Qty: 118 ML | Refills: 0 | Status: SHIPPED | OUTPATIENT
Start: 2023-08-14

## 2023-08-14 NOTE — H&P
normal  Neck: no adenopathy, no carotid bruit, no JVD, supple, symmetrical, trachea midline, and thyroid not enlarged, symmetric, no tenderness/mass/nodules  Back: symmetric, no curvature. ROM normal. No CVA tenderness. Lungs: clear to auscultation bilaterally  Chest wall: no tenderness  Heart: regular rate and rhythm, S1, S2 normal, no murmur, click, rub or gallop  Abdomen: soft, non-tender; bowel sounds normal; no masses,  no organomegaly  Extremities: extremities normal, atraumatic, no cyanosis or edema  Pulses: 2+ and symmetric  Skin: Skin color, texture, turgor normal. No rashes or lesions  Lymph nodes: Cervical, supraclavicular, and axillary nodes normal.  Neurologic: Grossly normal    Imaging Review  Plain radiographs demonstrate severe degenerative joint disease of the bilateral knee. The overall alignment is significant varus. The bone quality appears to be good for age and reported activity level. EKG performed today reviewed by me shows sinus rhythm with a rate of 59. No acute changes. Assessment:     End stage arthritis, right knee     Plan:     The patient history, physical examination and imaging studies are consistent with advanced degenerative joint disease of the right knee. The treatment options including medical management, injection therapy arthroscopy and arthroplasty were discussed at length. The risks and benefits of total knee arthroplasty were presented and reviewed. The risks due to aseptic loosening, infection, stiffness, patella tracking problems, thromboembolic complications among others were discussed. The patient acknowledged the explanation, agreed to proceed with the plan and a consent was signed. I explained to the patient that he needs to bathe daily with Hibiclens soap for 3 days prior to surgery. He will be n.p.o. midnight tonight for surgery. He will stop his anti-inflammatory medications after today. He will bring his walker with him the morning of surgery.

## 2023-08-15 LAB
BACTERIA UR CULT: NORMAL
MRSA, DNA, NASAL: NEGATIVE
SPECIMEN DESCRIPTION: NORMAL

## 2023-08-18 NOTE — DISCHARGE INSTRUCTIONS
[facility]    Orthopedic care:  MD Julianna Frey PA-C    Post Operative Instructions for Total Knee Replacement    Incision and dressing  Your incision is covered with a waterproof Aquacel dressing. It has been impregnated with silver nitrate to help kate off infection. The dressing is to be removed 7 days after the date of your surgery. The incision has been closed with skin glue. The glue will flake off over time and fall off on its own. DO NOT apply any lotions, creams, peroxide or Betadine to the skin glue, as it will degrade and dissolve the glue. DO NOT peel the glue off, as this could result in opening of the incision. There are no sutures that need to be removed; the skin and subcutaneous layers have been closed with dissolvable sutures, which will dissolve over 7 to 8 weeks. Showering  The Aquacel dressing is waterproof. You may shower when you feel ready. Once the Aquacel dressing has been removed, you may continue to shower. The glue provides a waterproof seal to the incision. Let the water run over the incision, and pat dry with a towel. Do not scrub or rub the glue. Compression stockings  The compression stockings/YANELI hose are used to help reduce swelling and assist in the prevention of blood clots. They are to be worn on the operative leg for 3 weeks. They should be worn full-time during the day, but may be removed at nighttime or during periods of elevation during the day. They are optional on the nonoperative leg, depending on how much swelling may be encountered. Activity  You will begin activity immediately after surgery. Physical therapy will commence (at home or as an outpatient) within a few days of surgery. You may bear weight on the operative leg as tolerated. It is encouraged that you get up for short walks frequently throughout the day.   Pump your ankles up and down at least 10 times every hour

## 2023-08-19 ENCOUNTER — ANESTHESIA EVENT (OUTPATIENT)
Dept: OPERATING ROOM | Age: 58
End: 2023-08-19
Payer: COMMERCIAL

## 2023-08-21 ENCOUNTER — APPOINTMENT (OUTPATIENT)
Dept: GENERAL RADIOLOGY | Age: 58
End: 2023-08-21
Attending: ORTHOPAEDIC SURGERY
Payer: COMMERCIAL

## 2023-08-21 ENCOUNTER — HOSPITAL ENCOUNTER (OUTPATIENT)
Age: 58
Setting detail: OBSERVATION
Discharge: HOME HEALTH | End: 2023-08-22
Attending: ORTHOPAEDIC SURGERY | Admitting: ORTHOPAEDIC SURGERY
Payer: COMMERCIAL

## 2023-08-21 ENCOUNTER — ANESTHESIA (OUTPATIENT)
Dept: OPERATING ROOM | Age: 58
End: 2023-08-21
Payer: COMMERCIAL

## 2023-08-21 DIAGNOSIS — Z96.651 S/P TOTAL KNEE ARTHROPLASTY, RIGHT: Primary | ICD-10-CM

## 2023-08-21 PROBLEM — M17.11 OSTEOARTHRITIS OF RIGHT KNEE, UNSPECIFIED OSTEOARTHRITIS TYPE: Status: ACTIVE | Noted: 2023-08-21

## 2023-08-21 PROCEDURE — 2500000003 HC RX 250 WO HCPCS: Performed by: PHYSICIAN ASSISTANT

## 2023-08-21 PROCEDURE — 2500000003 HC RX 250 WO HCPCS: Performed by: NURSE ANESTHETIST, CERTIFIED REGISTERED

## 2023-08-21 PROCEDURE — 7100000001 HC PACU RECOVERY - ADDTL 15 MIN: Performed by: ORTHOPAEDIC SURGERY

## 2023-08-21 PROCEDURE — A4217 STERILE WATER/SALINE, 500 ML: HCPCS | Performed by: ORTHOPAEDIC SURGERY

## 2023-08-21 PROCEDURE — 94150 VITAL CAPACITY TEST: CPT

## 2023-08-21 PROCEDURE — 7100000000 HC PACU RECOVERY - FIRST 15 MIN: Performed by: ORTHOPAEDIC SURGERY

## 2023-08-21 PROCEDURE — 2580000003 HC RX 258: Performed by: PHYSICIAN ASSISTANT

## 2023-08-21 PROCEDURE — 6360000002 HC RX W HCPCS: Performed by: PHYSICIAN ASSISTANT

## 2023-08-21 PROCEDURE — G0378 HOSPITAL OBSERVATION PER HR: HCPCS

## 2023-08-21 PROCEDURE — 97166 OT EVAL MOD COMPLEX 45 MIN: CPT

## 2023-08-21 PROCEDURE — 6360000002 HC RX W HCPCS: Performed by: ANESTHESIOLOGY

## 2023-08-21 PROCEDURE — 2580000003 HC RX 258: Performed by: ORTHOPAEDIC SURGERY

## 2023-08-21 PROCEDURE — 6370000000 HC RX 637 (ALT 250 FOR IP): Performed by: PHYSICIAN ASSISTANT

## 2023-08-21 PROCEDURE — 2709999900 HC NON-CHARGEABLE SUPPLY: Performed by: ORTHOPAEDIC SURGERY

## 2023-08-21 PROCEDURE — 97162 PT EVAL MOD COMPLEX 30 MIN: CPT

## 2023-08-21 PROCEDURE — 64447 NJX AA&/STRD FEMORAL NRV IMG: CPT | Performed by: ANESTHESIOLOGY

## 2023-08-21 PROCEDURE — 3700000000 HC ANESTHESIA ATTENDED CARE: Performed by: ORTHOPAEDIC SURGERY

## 2023-08-21 PROCEDURE — A4216 STERILE WATER/SALINE, 10 ML: HCPCS | Performed by: ORTHOPAEDIC SURGERY

## 2023-08-21 PROCEDURE — 6360000002 HC RX W HCPCS: Performed by: NURSE ANESTHETIST, CERTIFIED REGISTERED

## 2023-08-21 PROCEDURE — C1776 JOINT DEVICE (IMPLANTABLE): HCPCS | Performed by: ORTHOPAEDIC SURGERY

## 2023-08-21 PROCEDURE — 3600000015 HC SURGERY LEVEL 5 ADDTL 15MIN: Performed by: ORTHOPAEDIC SURGERY

## 2023-08-21 PROCEDURE — 6360000002 HC RX W HCPCS: Performed by: ORTHOPAEDIC SURGERY

## 2023-08-21 PROCEDURE — 3700000001 HC ADD 15 MINUTES (ANESTHESIA): Performed by: ORTHOPAEDIC SURGERY

## 2023-08-21 PROCEDURE — 3600000005 HC SURGERY LEVEL 5 BASE: Performed by: ORTHOPAEDIC SURGERY

## 2023-08-21 PROCEDURE — 73560 X-RAY EXAM OF KNEE 1 OR 2: CPT

## 2023-08-21 RX ORDER — METOCLOPRAMIDE HYDROCHLORIDE 5 MG/ML
10 INJECTION INTRAMUSCULAR; INTRAVENOUS
Status: DISCONTINUED | OUTPATIENT
Start: 2023-08-21 | End: 2023-08-21 | Stop reason: HOSPADM

## 2023-08-21 RX ORDER — SENNA AND DOCUSATE SODIUM 50; 8.6 MG/1; MG/1
1 TABLET, FILM COATED ORAL 2 TIMES DAILY
Status: DISCONTINUED | OUTPATIENT
Start: 2023-08-21 | End: 2023-08-22 | Stop reason: HOSPADM

## 2023-08-21 RX ORDER — DIPHENHYDRAMINE HYDROCHLORIDE 50 MG/ML
12.5 INJECTION INTRAMUSCULAR; INTRAVENOUS
Status: DISCONTINUED | OUTPATIENT
Start: 2023-08-21 | End: 2023-08-21 | Stop reason: HOSPADM

## 2023-08-21 RX ORDER — MORPHINE SULFATE 2 MG/ML
2 INJECTION, SOLUTION INTRAMUSCULAR; INTRAVENOUS
Status: DISCONTINUED | OUTPATIENT
Start: 2023-08-21 | End: 2023-08-22

## 2023-08-21 RX ORDER — SODIUM CHLORIDE 0.9 % (FLUSH) 0.9 %
5-40 SYRINGE (ML) INJECTION EVERY 12 HOURS SCHEDULED
Status: DISCONTINUED | OUTPATIENT
Start: 2023-08-21 | End: 2023-08-22 | Stop reason: HOSPADM

## 2023-08-21 RX ORDER — OXYCODONE HCL 10 MG/1
10 TABLET, FILM COATED, EXTENDED RELEASE ORAL ONCE
Status: COMPLETED | OUTPATIENT
Start: 2023-08-21 | End: 2023-08-21

## 2023-08-21 RX ORDER — POLYETHYLENE GLYCOL 3350 17 G/17G
17 POWDER, FOR SOLUTION ORAL DAILY PRN
Status: DISCONTINUED | OUTPATIENT
Start: 2023-08-21 | End: 2023-08-22 | Stop reason: HOSPADM

## 2023-08-21 RX ORDER — MIDAZOLAM HYDROCHLORIDE 1 MG/ML
INJECTION INTRAMUSCULAR; INTRAVENOUS PRN
Status: DISCONTINUED | OUTPATIENT
Start: 2023-08-21 | End: 2023-08-21 | Stop reason: SDUPTHER

## 2023-08-21 RX ORDER — CELECOXIB 200 MG/1
200 CAPSULE ORAL ONCE
Status: COMPLETED | OUTPATIENT
Start: 2023-08-21 | End: 2023-08-21

## 2023-08-21 RX ORDER — ONDANSETRON 2 MG/ML
INJECTION INTRAMUSCULAR; INTRAVENOUS PRN
Status: DISCONTINUED | OUTPATIENT
Start: 2023-08-21 | End: 2023-08-21 | Stop reason: SDUPTHER

## 2023-08-21 RX ORDER — SODIUM CHLORIDE, SODIUM LACTATE, POTASSIUM CHLORIDE, CALCIUM CHLORIDE 600; 310; 30; 20 MG/100ML; MG/100ML; MG/100ML; MG/100ML
INJECTION, SOLUTION INTRAVENOUS CONTINUOUS
Status: DISPENSED | OUTPATIENT
Start: 2023-08-21 | End: 2023-08-22

## 2023-08-21 RX ORDER — ONDANSETRON 2 MG/ML
4 INJECTION INTRAMUSCULAR; INTRAVENOUS
Status: DISCONTINUED | OUTPATIENT
Start: 2023-08-21 | End: 2023-08-21 | Stop reason: HOSPADM

## 2023-08-21 RX ORDER — SODIUM CHLORIDE 0.9 % (FLUSH) 0.9 %
5-40 SYRINGE (ML) INJECTION EVERY 12 HOURS SCHEDULED
Status: DISCONTINUED | OUTPATIENT
Start: 2023-08-21 | End: 2023-08-21 | Stop reason: HOSPADM

## 2023-08-21 RX ORDER — FENTANYL CITRATE 0.05 MG/ML
50 INJECTION, SOLUTION INTRAMUSCULAR; INTRAVENOUS EVERY 10 MIN PRN
Status: DISCONTINUED | OUTPATIENT
Start: 2023-08-21 | End: 2023-08-21 | Stop reason: HOSPADM

## 2023-08-21 RX ORDER — ONDANSETRON 4 MG/1
4 TABLET, ORALLY DISINTEGRATING ORAL EVERY 8 HOURS PRN
Status: DISCONTINUED | OUTPATIENT
Start: 2023-08-21 | End: 2023-08-22 | Stop reason: HOSPADM

## 2023-08-21 RX ORDER — MAGNESIUM HYDROXIDE 1200 MG/15ML
LIQUID ORAL CONTINUOUS PRN
Status: DISCONTINUED | OUTPATIENT
Start: 2023-08-21 | End: 2023-08-21 | Stop reason: HOSPADM

## 2023-08-21 RX ORDER — OXYCODONE HYDROCHLORIDE 5 MG/1
10 TABLET ORAL EVERY 4 HOURS PRN
Status: DISCONTINUED | OUTPATIENT
Start: 2023-08-21 | End: 2023-08-22 | Stop reason: HOSPADM

## 2023-08-21 RX ORDER — OXYCODONE HYDROCHLORIDE 5 MG/1
5 TABLET ORAL EVERY 4 HOURS PRN
Status: DISCONTINUED | OUTPATIENT
Start: 2023-08-21 | End: 2023-08-22 | Stop reason: HOSPADM

## 2023-08-21 RX ORDER — GLYCOPYRROLATE 0.2 MG/ML
INJECTION INTRAMUSCULAR; INTRAVENOUS PRN
Status: DISCONTINUED | OUTPATIENT
Start: 2023-08-21 | End: 2023-08-21 | Stop reason: SDUPTHER

## 2023-08-21 RX ORDER — MAGNESIUM HYDROXIDE 1200 MG/15ML
LIQUID ORAL PRN
Status: DISCONTINUED | OUTPATIENT
Start: 2023-08-21 | End: 2023-08-21 | Stop reason: HOSPADM

## 2023-08-21 RX ORDER — PROPOFOL 10 MG/ML
INJECTION, EMULSION INTRAVENOUS CONTINUOUS PRN
Status: DISCONTINUED | OUTPATIENT
Start: 2023-08-21 | End: 2023-08-21 | Stop reason: SDUPTHER

## 2023-08-21 RX ORDER — MEPERIDINE HYDROCHLORIDE 25 MG/ML
12.5 INJECTION INTRAMUSCULAR; INTRAVENOUS; SUBCUTANEOUS
Status: DISCONTINUED | OUTPATIENT
Start: 2023-08-21 | End: 2023-08-21 | Stop reason: HOSPADM

## 2023-08-21 RX ORDER — PROPOFOL 10 MG/ML
INJECTION, EMULSION INTRAVENOUS PRN
Status: DISCONTINUED | OUTPATIENT
Start: 2023-08-21 | End: 2023-08-21 | Stop reason: SDUPTHER

## 2023-08-21 RX ORDER — SCOLOPAMINE TRANSDERMAL SYSTEM 1 MG/1
1 PATCH, EXTENDED RELEASE TRANSDERMAL ONCE
Status: DISCONTINUED | OUTPATIENT
Start: 2023-08-21 | End: 2023-08-22

## 2023-08-21 RX ORDER — KETOROLAC TROMETHAMINE 30 MG/ML
30 INJECTION, SOLUTION INTRAMUSCULAR; INTRAVENOUS EVERY 6 HOURS
Status: COMPLETED | OUTPATIENT
Start: 2023-08-21 | End: 2023-08-21

## 2023-08-21 RX ORDER — ACETAMINOPHEN 500 MG
1000 TABLET ORAL ONCE
Status: COMPLETED | OUTPATIENT
Start: 2023-08-21 | End: 2023-08-21

## 2023-08-21 RX ORDER — ACETAMINOPHEN 500 MG
500 TABLET ORAL EVERY 6 HOURS PRN
Status: ON HOLD | COMMUNITY
End: 2023-08-22 | Stop reason: HOSPADM

## 2023-08-21 RX ORDER — SODIUM CHLORIDE 0.9 % (FLUSH) 0.9 %
5-40 SYRINGE (ML) INJECTION PRN
Status: DISCONTINUED | OUTPATIENT
Start: 2023-08-21 | End: 2023-08-22 | Stop reason: HOSPADM

## 2023-08-21 RX ORDER — EPHEDRINE SULFATE/0.9% NACL/PF 50 MG/5 ML
SYRINGE (ML) INTRAVENOUS PRN
Status: DISCONTINUED | OUTPATIENT
Start: 2023-08-21 | End: 2023-08-21 | Stop reason: SDUPTHER

## 2023-08-21 RX ORDER — ACETAMINOPHEN 325 MG/1
650 TABLET ORAL EVERY 6 HOURS
Status: DISCONTINUED | OUTPATIENT
Start: 2023-08-21 | End: 2023-08-22 | Stop reason: HOSPADM

## 2023-08-21 RX ORDER — ROPIVACAINE HYDROCHLORIDE 5 MG/ML
INJECTION, SOLUTION EPIDURAL; INFILTRATION; PERINEURAL PRN
Status: DISCONTINUED | OUTPATIENT
Start: 2023-08-21 | End: 2023-08-21 | Stop reason: SDUPTHER

## 2023-08-21 RX ORDER — ONDANSETRON 2 MG/ML
4 INJECTION INTRAMUSCULAR; INTRAVENOUS EVERY 6 HOURS PRN
Status: DISCONTINUED | OUTPATIENT
Start: 2023-08-21 | End: 2023-08-22 | Stop reason: HOSPADM

## 2023-08-21 RX ORDER — SODIUM CHLORIDE 9 MG/ML
INJECTION, SOLUTION INTRAVENOUS PRN
Status: DISCONTINUED | OUTPATIENT
Start: 2023-08-21 | End: 2023-08-22 | Stop reason: HOSPADM

## 2023-08-21 RX ORDER — SODIUM CHLORIDE, SODIUM LACTATE, POTASSIUM CHLORIDE, CALCIUM CHLORIDE 600; 310; 30; 20 MG/100ML; MG/100ML; MG/100ML; MG/100ML
INJECTION, SOLUTION INTRAVENOUS CONTINUOUS
Status: DISCONTINUED | OUTPATIENT
Start: 2023-08-21 | End: 2023-08-21 | Stop reason: HOSPADM

## 2023-08-21 RX ORDER — OXYCODONE HYDROCHLORIDE 5 MG/1
5 TABLET ORAL
Status: DISCONTINUED | OUTPATIENT
Start: 2023-08-21 | End: 2023-08-21 | Stop reason: HOSPADM

## 2023-08-21 RX ORDER — SODIUM CHLORIDE 0.9 % (FLUSH) 0.9 %
5-40 SYRINGE (ML) INJECTION PRN
Status: DISCONTINUED | OUTPATIENT
Start: 2023-08-21 | End: 2023-08-21 | Stop reason: HOSPADM

## 2023-08-21 RX ORDER — GABAPENTIN 100 MG/1
100 CAPSULE ORAL ONCE
Status: COMPLETED | OUTPATIENT
Start: 2023-08-21 | End: 2023-08-21

## 2023-08-21 RX ORDER — ASPIRIN 81 MG/1
81 TABLET ORAL 2 TIMES DAILY
Status: DISCONTINUED | OUTPATIENT
Start: 2023-08-21 | End: 2023-08-22 | Stop reason: HOSPADM

## 2023-08-21 RX ORDER — SODIUM CHLORIDE 9 MG/ML
INJECTION, SOLUTION INTRAVENOUS PRN
Status: DISCONTINUED | OUTPATIENT
Start: 2023-08-21 | End: 2023-08-21 | Stop reason: HOSPADM

## 2023-08-21 RX ADMIN — Medication 10 ML: at 20:29

## 2023-08-21 RX ADMIN — KETOROLAC TROMETHAMINE 30 MG: 30 INJECTION, SOLUTION INTRAMUSCULAR; INTRAVENOUS at 13:01

## 2023-08-21 RX ADMIN — ROPIVACAINE HYDROCHLORIDE 30 ML: 5 INJECTION, SOLUTION EPIDURAL; INFILTRATION; PERINEURAL at 09:07

## 2023-08-21 RX ADMIN — CEFAZOLIN 2000 MG: 2 INJECTION, POWDER, FOR SOLUTION INTRAMUSCULAR; INTRAVENOUS at 09:39

## 2023-08-21 RX ADMIN — Medication 10 MG: at 10:18

## 2023-08-21 RX ADMIN — OXYCODONE HYDROCHLORIDE 10 MG: 5 TABLET ORAL at 18:43

## 2023-08-21 RX ADMIN — KETOROLAC TROMETHAMINE 30 MG: 30 INJECTION, SOLUTION INTRAMUSCULAR; INTRAVENOUS at 17:13

## 2023-08-21 RX ADMIN — Medication 10 MG: at 10:19

## 2023-08-21 RX ADMIN — CEFAZOLIN 2000 MG: 2 INJECTION, POWDER, FOR SOLUTION INTRAMUSCULAR; INTRAVENOUS at 17:15

## 2023-08-21 RX ADMIN — MIDAZOLAM HYDROCHLORIDE 2 MG: 1 INJECTION, SOLUTION INTRAMUSCULAR; INTRAVENOUS at 09:04

## 2023-08-21 RX ADMIN — ONDANSETRON 4 MG: 2 INJECTION INTRAMUSCULAR; INTRAVENOUS at 22:46

## 2023-08-21 RX ADMIN — CELECOXIB 200 MG: 200 CAPSULE ORAL at 08:02

## 2023-08-21 RX ADMIN — SODIUM CHLORIDE, POTASSIUM CHLORIDE, SODIUM LACTATE AND CALCIUM CHLORIDE: 600; 310; 30; 20 INJECTION, SOLUTION INTRAVENOUS at 08:01

## 2023-08-21 RX ADMIN — SENNOSIDES AND DOCUSATE SODIUM 1 TABLET: 50; 8.6 TABLET ORAL at 20:07

## 2023-08-21 RX ADMIN — SODIUM CHLORIDE, POTASSIUM CHLORIDE, SODIUM LACTATE AND CALCIUM CHLORIDE: 600; 310; 30; 20 INJECTION, SOLUTION INTRAVENOUS at 13:02

## 2023-08-21 RX ADMIN — Medication 10 MG: at 10:38

## 2023-08-21 RX ADMIN — ACETAMINOPHEN 650 MG: 325 TABLET ORAL at 13:02

## 2023-08-21 RX ADMIN — TRANEXAMIC ACID 1000 MG: 100 INJECTION, SOLUTION INTRAVENOUS at 09:49

## 2023-08-21 RX ADMIN — ASPIRIN 81 MG: 81 TABLET, COATED ORAL at 20:07

## 2023-08-21 RX ADMIN — ACETAMINOPHEN 1000 MG: 500 TABLET ORAL at 08:02

## 2023-08-21 RX ADMIN — SODIUM CHLORIDE, POTASSIUM CHLORIDE, SODIUM LACTATE AND CALCIUM CHLORIDE: 600; 310; 30; 20 INJECTION, SOLUTION INTRAVENOUS at 09:54

## 2023-08-21 RX ADMIN — GLYCOPYRROLATE 0.2 MG: 0.2 INJECTION INTRAMUSCULAR; INTRAVENOUS at 09:53

## 2023-08-21 RX ADMIN — PROPOFOL 100 MG: 10 INJECTION, EMULSION INTRAVENOUS at 09:34

## 2023-08-21 RX ADMIN — KETOROLAC TROMETHAMINE 30 MG: 30 INJECTION, SOLUTION INTRAMUSCULAR; INTRAVENOUS at 23:47

## 2023-08-21 RX ADMIN — GABAPENTIN 100 MG: 100 CAPSULE ORAL at 08:03

## 2023-08-21 RX ADMIN — PROPOFOL 20 MCG/KG/MIN: 10 INJECTION, EMULSION INTRAVENOUS at 09:35

## 2023-08-21 RX ADMIN — OXYCODONE HYDROCHLORIDE 10 MG: 5 TABLET ORAL at 14:49

## 2023-08-21 RX ADMIN — OXYCODONE HYDROCHLORIDE 10 MG: 5 TABLET ORAL at 22:44

## 2023-08-21 RX ADMIN — ACETAMINOPHEN 650 MG: 325 TABLET ORAL at 20:07

## 2023-08-21 RX ADMIN — OXYCODONE HYDROCHLORIDE 10 MG: 10 TABLET, FILM COATED, EXTENDED RELEASE ORAL at 08:02

## 2023-08-21 RX ADMIN — TRANEXAMIC ACID 1000 MG: 100 INJECTION, SOLUTION INTRAVENOUS at 10:31

## 2023-08-21 RX ADMIN — ONDANSETRON 4 MG: 2 INJECTION INTRAMUSCULAR; INTRAVENOUS at 09:50

## 2023-08-21 ASSESSMENT — PAIN SCALES - GENERAL
PAINLEVEL_OUTOF10: 7
PAINLEVEL_OUTOF10: 5
PAINLEVEL_OUTOF10: 8
PAINLEVEL_OUTOF10: 0
PAINLEVEL_OUTOF10: 7
PAINLEVEL_OUTOF10: 5
PAINLEVEL_OUTOF10: 7
PAINLEVEL_OUTOF10: 5
PAINLEVEL_OUTOF10: 0
PAINLEVEL_OUTOF10: 5
PAINLEVEL_OUTOF10: 0
PAINLEVEL_OUTOF10: 7
PAINLEVEL_OUTOF10: 0

## 2023-08-21 ASSESSMENT — PAIN DESCRIPTION - LOCATION
LOCATION: KNEE
LOCATION: LEG
LOCATION: KNEE
LOCATION: KNEE

## 2023-08-21 ASSESSMENT — PAIN DESCRIPTION - ORIENTATION
ORIENTATION: RIGHT

## 2023-08-21 ASSESSMENT — PAIN DESCRIPTION - DESCRIPTORS
DESCRIPTORS: ACHING

## 2023-08-21 NOTE — CARE COORDINATION
MET WITH PT AND SPOUSE AT BEDSIDE. PT IS OBSERVATION. PT FROM HOME W/WIFE. INDEPENDENT AT BASELINE. NO O2, NO HD. NO VA.   PT HAS WALKER AND RAISED TOILET SEAT. HAS ACCESS TO SHOWER CHAIR IF NEEDED. [facility]. FOC IS OFFERED- [address] IS CHOICE. REFERRAL CALLED TO [facility]  [address] [place]. PT ACCEPTED.

## 2023-08-21 NOTE — OP NOTE
appropriate pain medications    Electronically signed by Aracelis Holliday MD on 8/21/2023 at 10:46 AM

## 2023-08-21 NOTE — ANESTHESIA PRE PROCEDURE
08/14/2023 10:31 AM    CO2 26 08/14/2023 10:31 AM    BUN 13 08/14/2023 10:31 AM    CREATININE 1.02 08/14/2023 10:31 AM    GFRAA >60.0 05/16/2013 10:47 AM    LABGLOM >60.0 08/14/2023 10:31 AM    GLUCOSE 103 08/14/2023 10:31 AM    PROT 7.3 05/16/2013 10:47 AM    CALCIUM 9.7 08/14/2023 10:31 AM    BILITOT 1.2 05/16/2013 10:47 AM    ALKPHOS 65 05/16/2013 10:47 AM    AST 36 05/16/2013 10:47 AM    ALT 40 05/16/2013 10:47 AM       POC Tests: No results for input(s): POCGLU, POCNA, POCK, POCCL, POCBUN, POCHEMO, POCHCT in the last 72 hours. Coags:   Lab Results   Component Value Date/Time    PROTIME 14.1 08/14/2023 10:31 AM    INR 1.1 08/14/2023 10:31 AM       HCG (If Applicable): No results found for: PREGTESTUR, PREGSERUM, HCG, HCGQUANT     ABGs: No results found for: PHART, PO2ART, SKI9XCP, AWW3BPW, BEART, I8ECQNND     Type & Screen (If Applicable):  No results found for: LABABO, LABRH    Drug/Infectious Status (If Applicable):  No results found for: HIV, HEPCAB    COVID-19 Screening (If Applicable): No results found for: COVID19        Anesthesia Evaluation  Patient summary reviewed and Nursing notes reviewed no history of anesthetic complications:   Airway: Mallampati: II  TM distance: >3 FB   Neck ROM: full  Mouth opening: > = 3 FB   Dental: normal exam         Pulmonary:Negative Pulmonary ROS and normal exam                               Cardiovascular:Negative CV ROS                      Neuro/Psych:   Negative Neuro/Psych ROS              GI/Hepatic/Renal:   (+) morbid obesity          Endo/Other: Negative Endo/Other ROS                    Abdominal:   (+) obese,           Vascular: negative vascular ROS. Other Findings:           Anesthesia Plan      spinal     ASA 2       Induction: intravenous. Anesthetic plan and risks discussed with patient. Plan discussed with CRNA.     Attending anesthesiologist reviewed and agrees with Preprocedure content      Post-op pain plan if not by surgeon: single Detail Level: Zone Render In Strict Bullet Format?: No Initiate Treatment: - Melasma compound (6% hydroquinone, 0.05% tretinoin, 0.05% desonide, 4% kojic acid) \\n- Tranexamic acid 650mg- take 1/2 PO BID (Negative screening for clotting risk. S/Sx of clots discussed.)\\n- Sunscreen with SPF 30+ QAM with reapplication Q2hr and wide brimmed hats Samples Given: Cetaphil Mineral Sunscreen \\nEucerin Daily Protection Plan: Follow up in 3 months.

## 2023-08-21 NOTE — ANESTHESIA POSTPROCEDURE EVALUATION
Department of Anesthesiology  Postprocedure Note    Patient: Santosh Damon  MRN: [id number]  [facility], [place]: 1965  Date of evaluation: 8/21/2023      Procedure Summary     Date: 08/21/23 Room / Location: [address]    Anesthesia Start: 5464 Anesthesia Stop: 0194    Procedure: Right total knee arthroplasty (Right: Knee) Diagnosis:       Osteoarthritis of right knee      (Osteoarthritis of right knee [M17.11])    Surgeons: Michelle Cano MD Responsible Provider: Talia Hi MD    Anesthesia Type: MAC, Regional ASA Status: 2          Anesthesia Type: MAC, Regional    Deepak Phase I:      Deepak Phase II: Deepak Score: 10      Anesthesia Post Evaluation    Patient location during evaluation: PACU  Patient participation: complete - patient participated  Level of consciousness: awake  Pain score: 0  Airway patency: patent  Nausea & Vomiting: no nausea and no vomiting  Complications: no  Cardiovascular status: hemodynamically stable  Respiratory status: acceptable, spontaneous ventilation, nonlabored ventilation and face mask  Hydration status: stable  Pain management: adequate

## 2023-08-21 NOTE — ANESTHESIA PROCEDURE NOTES
Spinal Block    Patient location during procedure: pre-op  Reason for block: primary anesthetic  Staffing  Performed: anesthesiologist   Anesthesiologist: Vincent Cardenas MD  Spinal Block  Patient position: sitting  Prep: Betadine  Patient monitoring: cardiac monitor, continuous pulse ox and frequent blood pressure checks  Approach: midline  Location: L4/L5  Provider prep: mask and sterile gloves  Local infiltration: lidocaine  Needle  Needle type: Pencan   Needle gauge: 24 G  Assessment  Events: cerebrospinal fluid  Swirl obtained: Yes  CSF: clear  Attempts: 1  Hemodynamics: stable  Preanesthetic Checklist  Completed: patient identified, IV checked, site marked, risks and benefits discussed, surgical/procedural consents, equipment checked, pre-op evaluation, timeout performed, anesthesia consent given, oxygen available, monitors applied/VS acknowledged, fire risk safety assessment completed and verbalized and blood product R/B/A discussed and consented

## 2023-08-21 NOTE — ANESTHESIA PROCEDURE NOTES
Peripheral Block    Patient location during procedure: pre-op  Reason for block: post-op pain management  Start time: 8/21/2023 9:02 AM  Staffing  Performed: anesthesiologist   Anesthesiologist: Ean Schumacher MD  Preanesthetic Checklist  Completed: patient identified, IV checked, site marked, risks and benefits discussed, surgical/procedural consents, equipment checked, pre-op evaluation, timeout performed, anesthesia consent given, oxygen available, monitors applied/VS acknowledged, fire risk safety assessment completed and verbalized and blood product R/B/A discussed and consented  Peripheral Block   Patient position: supine  Prep: ChloraPrep  Provider prep: mask and sterile gloves  Patient monitoring: cardiac monitor, continuous pulse ox, frequent blood pressure checks and IV access  Block type: Saphenous  Laterality: right  Injection technique: single-shot  Guidance: ultrasound guided  Local infiltration: ropivacaine  Infiltration strength: 0.5 %  Local infiltration: ropivacaine  Dose: 30 mL    Needle   Needle type: insulated echogenic nerve stimulator needle   Needle gauge: 21 G  Needle localization: ultrasound guidance  Test dose: negative  Needle length: 10 cm  Assessment   Injection assessment: negative aspiration for heme, no paresthesia on injection, local visualized surrounding nerve on ultrasound and no intravascular symptoms  Paresthesia pain: none  Slow fractionated injection: yes  Hemodynamics: stable  Real-time US image taken/store: yes  Outcomes: uncomplicated

## 2023-08-21 NOTE — H&P
The history and physical in the electronic medical record dated 8/14/23 was personally reviewed. There are no interval changes that need to be made. Plan is to proceed with a right total knee as scheduled. The patient is opted to proceed with a knee replacement surgery. I brought the model in, and we sat down and talked about surgery. We talked about the recovery. I stressed the importance of physical therapy and active participation in physical therapy to the patient in order to achieve a satisfactory postoperative outcome. We went over the risks of surgery. Risks include, but are not limited to, infection, neurovascular injury, hematoma formation, wound healing complications, blood clot, persistent postoperative pain, ligament injury, and risks of anesthesia. The patient expressed understanding and wishes to proceed with a total knee replacement as above.

## 2023-08-21 NOTE — PLAN OF CARE
See OT evaluation for all goals and OT POC.  Electronically signed by Greta Gillis OTR/L on 8/21/2023 at 4:34 PM

## 2023-08-22 VITALS
WEIGHT: 239 LBS | HEIGHT: 71 IN | SYSTOLIC BLOOD PRESSURE: 120 MMHG | HEART RATE: 72 BPM | TEMPERATURE: 97.6 F | DIASTOLIC BLOOD PRESSURE: 66 MMHG | OXYGEN SATURATION: 95 % | BODY MASS INDEX: 33.46 KG/M2 | RESPIRATION RATE: 18 BRPM

## 2023-08-22 PROCEDURE — 97535 SELF CARE MNGMENT TRAINING: CPT

## 2023-08-22 PROCEDURE — 2580000003 HC RX 258: Performed by: PHYSICIAN ASSISTANT

## 2023-08-22 PROCEDURE — 6370000000 HC RX 637 (ALT 250 FOR IP): Performed by: NURSE PRACTITIONER

## 2023-08-22 PROCEDURE — 97116 GAIT TRAINING THERAPY: CPT

## 2023-08-22 PROCEDURE — 96361 HYDRATE IV INFUSION ADD-ON: CPT

## 2023-08-22 PROCEDURE — 6370000000 HC RX 637 (ALT 250 FOR IP): Performed by: PHYSICIAN ASSISTANT

## 2023-08-22 PROCEDURE — 6360000002 HC RX W HCPCS: Performed by: ORTHOPAEDIC SURGERY

## 2023-08-22 PROCEDURE — 96374 THER/PROPH/DIAG INJ IV PUSH: CPT

## 2023-08-22 PROCEDURE — 6360000002 HC RX W HCPCS: Performed by: PHYSICIAN ASSISTANT

## 2023-08-22 PROCEDURE — G0378 HOSPITAL OBSERVATION PER HR: HCPCS

## 2023-08-22 PROCEDURE — 51798 US URINE CAPACITY MEASURE: CPT

## 2023-08-22 PROCEDURE — 6370000000 HC RX 637 (ALT 250 FOR IP): Performed by: INTERNAL MEDICINE

## 2023-08-22 PROCEDURE — 97110 THERAPEUTIC EXERCISES: CPT

## 2023-08-22 PROCEDURE — 99253 IP/OBS CNSLTJ NEW/EST LOW 45: CPT | Performed by: PHYSICIAN ASSISTANT

## 2023-08-22 RX ORDER — OXYCODONE HYDROCHLORIDE 5 MG/1
5 TABLET ORAL EVERY 6 HOURS PRN
Qty: 24 TABLET | Refills: 0 | Status: SHIPPED | OUTPATIENT
Start: 2023-08-22 | End: 2023-08-28

## 2023-08-22 RX ORDER — KETOROLAC TROMETHAMINE 15 MG/ML
15 INJECTION, SOLUTION INTRAMUSCULAR; INTRAVENOUS EVERY 6 HOURS
Status: DISCONTINUED | OUTPATIENT
Start: 2023-08-22 | End: 2023-08-22 | Stop reason: HOSPADM

## 2023-08-22 RX ORDER — MELOXICAM 7.5 MG/1
15 TABLET ORAL DAILY
Qty: 45 TABLET | Refills: 0 | Status: SHIPPED | OUTPATIENT
Start: 2023-08-22 | End: 2023-10-06

## 2023-08-22 RX ORDER — SENNA AND DOCUSATE SODIUM 50; 8.6 MG/1; MG/1
1 TABLET, FILM COATED ORAL 2 TIMES DAILY
Qty: 20 TABLET | Refills: 0 | Status: SHIPPED | OUTPATIENT
Start: 2023-08-22 | End: 2023-09-01

## 2023-08-22 RX ORDER — TAMSULOSIN HYDROCHLORIDE 0.4 MG/1
0.4 CAPSULE ORAL ONCE
Status: COMPLETED | OUTPATIENT
Start: 2023-08-22 | End: 2023-08-22

## 2023-08-22 RX ORDER — ONDANSETRON 4 MG/1
4 TABLET, ORALLY DISINTEGRATING ORAL EVERY 8 HOURS PRN
Qty: 21 TABLET | Refills: 0 | Status: SHIPPED | OUTPATIENT
Start: 2023-08-22 | End: 2023-08-29

## 2023-08-22 RX ORDER — LIDOCAINE HYDROCHLORIDE 20 MG/ML
JELLY TOPICAL PRN
Status: DISCONTINUED | OUTPATIENT
Start: 2023-08-22 | End: 2023-08-22 | Stop reason: HOSPADM

## 2023-08-22 RX ORDER — TIZANIDINE 4 MG/1
4 TABLET ORAL EVERY 8 HOURS PRN
Qty: 21 TABLET | Refills: 0 | Status: SHIPPED | OUTPATIENT
Start: 2023-08-22 | End: 2023-08-29

## 2023-08-22 RX ORDER — ASPIRIN 81 MG/1
81 TABLET ORAL 2 TIMES DAILY
Qty: 60 TABLET | Refills: 0 | Status: SHIPPED | OUTPATIENT
Start: 2023-08-22 | End: 2023-09-21

## 2023-08-22 RX ORDER — TAMSULOSIN HYDROCHLORIDE 0.4 MG/1
0.4 CAPSULE ORAL DAILY
Status: DISCONTINUED | OUTPATIENT
Start: 2023-08-23 | End: 2023-08-22 | Stop reason: HOSPADM

## 2023-08-22 RX ADMIN — OXYCODONE HYDROCHLORIDE 5 MG: 5 TABLET ORAL at 09:20

## 2023-08-22 RX ADMIN — TAMSULOSIN HYDROCHLORIDE 0.4 MG: 0.4 CAPSULE ORAL at 13:19

## 2023-08-22 RX ADMIN — OXYCODONE HYDROCHLORIDE 10 MG: 5 TABLET ORAL at 02:53

## 2023-08-22 RX ADMIN — LIDOCAINE HYDROCHLORIDE: 20 JELLY TOPICAL at 06:02

## 2023-08-22 RX ADMIN — SENNOSIDES AND DOCUSATE SODIUM 1 TABLET: 50; 8.6 TABLET ORAL at 09:20

## 2023-08-22 RX ADMIN — ACETAMINOPHEN 650 MG: 325 TABLET ORAL at 01:35

## 2023-08-22 RX ADMIN — KETOROLAC TROMETHAMINE 15 MG: 15 INJECTION, SOLUTION INTRAMUSCULAR; INTRAVENOUS at 16:11

## 2023-08-22 RX ADMIN — CEFAZOLIN 2000 MG: 2 INJECTION, POWDER, FOR SOLUTION INTRAMUSCULAR; INTRAVENOUS at 01:30

## 2023-08-22 RX ADMIN — ACETAMINOPHEN 650 MG: 325 TABLET ORAL at 06:34

## 2023-08-22 RX ADMIN — ACETAMINOPHEN 650 MG: 325 TABLET ORAL at 13:18

## 2023-08-22 RX ADMIN — ASPIRIN 81 MG: 81 TABLET, COATED ORAL at 09:21

## 2023-08-22 ASSESSMENT — PAIN DESCRIPTION - DESCRIPTORS
DESCRIPTORS: ACHING

## 2023-08-22 ASSESSMENT — PAIN DESCRIPTION - LOCATION
LOCATION: KNEE

## 2023-08-22 ASSESSMENT — PAIN SCALES - GENERAL
PAINLEVEL_OUTOF10: 8
PAINLEVEL_OUTOF10: 7
PAINLEVEL_OUTOF10: 7
PAINLEVEL_OUTOF10: 6
PAINLEVEL_OUTOF10: 6
PAINLEVEL_OUTOF10: 8
PAINLEVEL_OUTOF10: 4

## 2023-08-22 ASSESSMENT — PAIN DESCRIPTION - ORIENTATION
ORIENTATION: RIGHT

## 2023-08-22 ASSESSMENT — ENCOUNTER SYMPTOMS: APNEA: 0

## 2023-08-22 NOTE — FLOWSHEET NOTE
Assessment completed. VSS. Pt alert et oriented x4. Dressing dry et intact to right knee. Spouse at bedside. Call light within reach. Electronically signed by Asael Peralta RN on 8/22/2023 at 3:16 AM

## 2023-08-22 NOTE — DISCHARGE INSTR - COC
08/22/23 1200   Odor None 08/22/23 1200   Number of days: 1        Elimination:  Continence: Bowel: Yes  Bladder: Yes  Urinary Catheter: Insertion Date: 8/22/23    Colostomy/Ileostomy/Ileal Conduit: No       Date of Last BM: 8/20/23    Intake/Output Summary (Last 24 hours) at 8/22/2023 1618  Last data filed at 8/22/2023 0640  Gross per 24 hour   Intake --   Output 750 ml   Net -750 ml     I/O last 3 completed shifts: In: 2120 [I.V.:1900; IV Piggyback:220]  Out: 850 [Urine:750; Blood:100]    Safety Concerns:     None and At Risk for Falls    Impairments/Disabilities:      None    Nutrition Therapy:  Current Nutrition Therapy:   - Oral Diet:  General    Routes of Feeding: Oral  Liquids: Thin Liquids  Daily Fluid Restriction: no  Last Modified Barium Swallow with Video (Video Swallowing Test): not done    Treatments at the Time of Hospital Discharge:   Respiratory Treatments: ***  Oxygen Therapy:  is not on home oxygen therapy.   Ventilator:    - No ventilator support    Rehab Therapies: Physical Therapy and Occupational Therapy  Weight Bearing Status/Restrictions: No weight bearing restrictions  Other Medical Equipment (for information only, NOT a DME order):  walker  Other Treatments: ***    Patient's personal belongings (please select all that are sent with patient):  None    RN SIGNATURE:  Electronically signed by Rochelle Jeans, RN on 8/22/23 at 4:20 PM EDT    CASE MANAGEMENT/SOCIAL WORK SECTION    Inpatient Status Date: ***    Readmission Risk Assessment Score:  Readmission Risk              Risk of Unplanned Readmission:  4           Discharging to Facility/ Agency   Name:   Address:  Phone:  Fax:    Dialysis Facility (if applicable)   Name:  Address:  Dialysis Schedule:  Phone:  Fax:    / signature: {Esignature:[id number]}    PHYSICIAN SECTION    Prognosis: {Prognosis:[id number]}    Condition at Discharge: [address] Patient Condition:[id number]}    Rehab Potential (if transferring to Rehab):

## 2023-08-22 NOTE — FLOWSHEET NOTE
Pt no void for shift. Denies any urge or discomfort from not voiding. Bladder scanned for 657ml. Received order to straight cath for retention. Electronically signed by Sushila Chaves RN on 8/22/2023 at 6:54 AM

## 2023-08-22 NOTE — CARE COORDINATION
Spoke with Aldo Schmidt with [facility] and confirmed acceptance of pt for home care services. Dc plan remains home.

## 2023-08-22 NOTE — DISCHARGE SUMMARY
[facility]  Discharge Summary    Orthopedic care:  Dr. Ariella Geller PA-C    Admission Date: 8/21/2023   Discharge Date:  8/22    Reason for admission / final diagnosis: Right total knee replacement    Hospital Course (with pertinent laboratory and radiographic results): The patient was admitted under the indication of right total knee replacement  The surgery was performed by Dr. Derek Crowe on 8/21 , there was no intraoperative or postoperative complications. There is no significant pertinent abnormalities on any of the blood work following the surgery. X-ray of the patient's right knee did not demonstrate any foreign bodies, lucency, fracture, is well-placed prosthesis. The patient's pain is well controlled with Roxicodone. We discussed adverse reaction to this medication which includes addiction, constipation, nausea and vomiting. To best ensure that they will not experience any significant constipation they were prescribed a bowel regimen to be be taken whenever she is using the narcotics. He was also prescribed Zofran to help with any nausea. To help with this pain I will also add an anti-inflammatory to his regimen. We will use aspirin 81 mg twice daily for DVT prophylaxis. The patient was evaluated and treated by physical and Occupational Therapy today. He did very well in regards to his motion, is able to go up stairs without any significant difficulties. As long as he is able to void on his own as he was straight cathed overnight, he will be able to go home this evening.

## 2023-08-23 NOTE — PROGRESS NOTES
[facility] MED SURG TREATMENT NOTE     Date: 2023  Patient Name: Alla Cortes        MRN: [id number]  Account: [de-identified]   : 1965  (56 y.o.)  Room: [place] [address]    Chart Review:    Restrictions:WBAT right LE        Safety:  Safety Devices  Type of Devices: All fall risk precautions in place;Call light within reach; Left in bed;Nurse notified    Patient's birthday verified: Yes    Subjective:Pt seen with spouse present. Pt pleasant and cooperative            Pain at start of treatment: Yes: 7/10    Pain at end of treatment: Yes: 6/10    Location: right knee  Description hurts  Nursing notified: Yes  RN: N/A  Intervention: Repositioned    Objective:    ADL Status:  ADL  Feeding: Independent  Grooming: Setup  UE Bathing: Modified independent   LE Bathing: Moderate assistance  UE Dressing: Setup  LE Dressing: Minimal assistance; Increased time to complete; Adaptive equipment  Toileting: Unable to assess(comment)    YANELI Hose donned: No  If no - why  N/A    Therapy key for assistance levels -   Independent/Mod I = Pt. is able to perform task with no assistance but may require a device   Stand by assistance = Pt. does not perform task at an independent level but does not need physical assistance, requires verbal cues  Minimal, Moderate, Maximal Assistance = Pt. requires physical assistance (25%, 50%, 75% assist from helper) for task but is able to actively participate in task   Dependent = Pt. requires total assistance with task and is not able to actively participate with task completion    Orientation Status:WFL       Observation:Right knee bandaged       Cognition Status:WFL       Perception Status:WFL       Vision and Hearing Status:WFL              Functional Mobility:  Patient ambulated to/from bathroom with WW at Supervision level. Transfers:Supervision       Bed Mobility:Supervision with adaptive technique to scoop right leg with left after instruction.        Seated and
[facility] OCCUPATIONAL THERAPY EVALUATION - ACUTE     NAME: Radha Sheffield  : 1965 (56 y.o.)  MRN: [id number]  CODE STATUS: Full Code  Room: U5E262-45    Date of Service: 2023    Patient Diagnosis(es): Osteoarthritis of right knee [M17.11]  S/P total knee arthroplasty, right [Z96.651]  Osteoarthritis of right knee, unspecified osteoarthritis type [M17.11]   Patient Active Problem List    Diagnosis Date Noted    S/P total knee arthroplasty, right 2023    Osteoarthritis of right knee, unspecified osteoarthritis type 2023    Osteoarthritis of right knee 2023        History reviewed. No pertinent past medical history. History reviewed. No pertinent surgical history. Restrictions  Restrictions/Precautions: Weight Bearing      Right Lower Extremity Weight Bearing: Weight Bearing As Tolerated       Safety Devices: Safety Devices  Type of Devices: All fall risk precautions in place;Call light within reach; Bed alarm in place; Left in bed;Nurse notified     Patient's date of birth confirmed: Yes    General:  Chart Reviewed: Yes  Patient assessed for rehabilitation services?: Yes    Subjective  Subjective: \"I am feeling it, yeah\"       Pain at start of treatment: Yes: 10    Pain at end of treatment: Yes: 6/10    Location: R knee  Description: Sore  Nursing notified: Yes  RN: Wan Arreguin  Intervention: RN provided pain medication Repositioned Other: Pt had medication just prior to OT    Prior Level of Function:  Social/Functional History  Lives With: Spouse (And adult son 'now and then\")  Type of Home: House  Home Layout: One level, Laundry in basement  Home Access: Stairs to enter with rails  Entrance Stairs - Number of Steps: 4 in back, 2 in front but one is high both with rails  Entrance Stairs - Rails: Both  Bathroom Shower/Tub: Tub/Shower unit  Bathroom Equipment: Hand-held shower (Can borrow shower chair from mother)  Home Equipment: Marlin Anastacia, rolling  ADL Assistance:
Physical Therapy  Facility/Department: [facility] MED SURG W163/F331-82  Physical Therapy Discharge      NAME: Gayathri Serra    : 1965 (56 y.o.)  MRN: [id number]    Account: [de-identified]  Gender: male      Patient has been discharged from acute care hospital. DC patient from current PT program.      Electronically signed by Dayana Barboza PT on 23 at 12:51 PM EDT
Physical Therapy Med Surg Daily Treatment Note  [facility]  [facility]       NAME: Nickie Velasco  : 1965 (56 y.o.)  MRN: [id number]  CODE STATUS: Full Code    Date of Service: 2023    Patient Diagnosis(es): Osteoarthritis of right knee [M17.11]  S/P total knee arthroplasty, right [Z96.651]  Osteoarthritis of right knee, unspecified osteoarthritis type [M17.11]   No chief complaint on file. Patient Active Problem List    Diagnosis Date Noted    S/P total knee arthroplasty, right 2023    Osteoarthritis of right knee, unspecified osteoarthritis type 2023    Osteoarthritis of right knee 2023        History reviewed. No pertinent past medical history. History reviewed. No pertinent surgical history. Chart Reviewed: Yes  Family / Caregiver Present: No    Restrictions:  Restrictions/Precautions: Weight Bearing  Lower Extremity Weight Bearing Restrictions  Right Lower Extremity Weight Bearing: Weight Bearing As Tolerated    SUBJECTIVE:   Subjective: \"They had to cath me 4 times last night. \"    Pain  Pain: 7/10 R knee pre/post tx. RN recently medicated pt. OBJECTIVE:        Bed mobility  Supine to Sit: Stand by assistance  Sit to Supine: Stand by assistance  Bed Mobility Comments: Increased effort to complete, pt utilizes compensatory technique to lift RLE into bed. vc's for improved efficiency. Transfers  Sit to Stand: Stand by assistance  Stand to Sit: Stand by assistance  Car Transfer: Stand by assistance  Comment: vc's for improved WW management during approach to sitting surface, pt steady otherwise, good hand placement noted. Ambulation  Surface: Level tile  Device: Rolling Walker  Assistance: Supervision  Quality of Gait: antalgic, step to pattern, appropriate bracing through [employer].  Gait Deviations: Decreased step length;Decreased step height;Slow Martina  Distance: 50'.  20'  Comments: good tolerance and
Physical Therapy Med Surg Initial Assessment  Facility/Department: [facility]  Room: P892/K286-37     NAME: Darion Hooper  : 1965 (56 y.o.)  MRN: [id number]  CODE STATUS: Full Code    Date of Service: 2023    Patient Diagnosis(es): Osteoarthritis of right knee [M17.11]  S/P total knee arthroplasty, right [Z96.651]  Osteoarthritis of right knee, unspecified osteoarthritis type [M17.11]   No chief complaint on file. Patient Active Problem List    Diagnosis Date Noted    S/P total knee arthroplasty, right 2023    Osteoarthritis of right knee, unspecified osteoarthritis type 2023    Osteoarthritis of right knee 2023      History reviewed. No pertinent past medical history. History reviewed. No pertinent surgical history. Chief Reason: General Medical  Chart Reviewed: Yes  Patient assessed for rehabilitation services?: Yes  Family / Caregiver Present: No    Restrictions:  Restrictions/Precautions: Weight Bearing  Lower Extremity Weight Bearing Restrictions  Right Lower Extremity Weight Bearing: Weight Bearing As Tolerated     SUBJECTIVE:   Subjective: Pt reports post op pain    Pain  Pre treatment screenin/10 OR Faces 1-10  Location: R knee  Description: ache  Onset/duration: post op  [x]  Pt declined intervention- RN gave meds prior to PT arrival  [x]  Pt able to proceed PT session  []  RN or physician notified  []  RN called to bedside to administer meds.   Post treatment screenin/10, described as same as above     Prior Level of Function:  Social/Functional History  Lives With: Spouse (And adult son 'now and then\")  Type of Home: House  Home Layout: One level, Laundry in basement  Home Access: Stairs to enter with rails  Entrance Stairs - Number of Steps: 4 in back, 2 in front but one is high both with rails  Entrance Stairs - Rails: Both  Bathroom Shower/Tub: Tub/Shower unit  Bathroom Equipment: Hand-held shower (Can borrow shower chair from mother)  Home
Physician Progress Note    8/22/2023   10:54 AM    Name:  Donte Parada  MRN:    [id number]      Day: 1     Admit Date: 8/21/2023  7:49 AM  PCP: Faith Arora MD    Code Status:  Full Code    Subjective:     He is having difficulty urinating.   Otherwise he is doing okay    Current Facility-Administered Medications   Medication Dose Route Frequency Provider Last Rate Last Admin    lidocaine (XYLOCAINE) 2 % uro-jet   Topical PRN Nicoletto Bolzan-Roche, APRN - CNP   Given at 08/22/23 0602    tamsulosin (FLOMAX) capsule 0.4 mg  0.4 mg Oral Once Carylon Sarah, DO        lactated ringers IV soln infusion   IntraVENous Continuous Howard Spencer PA-C   Stopped at 08/22/23 2609    sodium chloride flush 0.9 % injection 5-40 mL  5-40 mL IntraVENous 2 times per day Howard Spencer PA-C   10 mL at 08/21/23 2029    sodium chloride flush 0.9 % injection 5-40 mL  5-40 mL IntraVENous PRN Howard Spencer PA-C        0.9 % sodium chloride infusion   IntraVENous PRN Howard Spencer PA-C        acetaminophen (TYLENOL) tablet 650 mg  650 mg Oral Q6H Jose C Crump PA-C   650 mg at 08/22/23 6560    oxyCODONE (ROXICODONE) immediate release tablet 5 mg  5 mg Oral Q4H PRN Howard Spencer PA-C   5 mg at 08/22/23 9652    Or    oxyCODONE (ROXICODONE) immediate release tablet 10 mg  10 mg Oral Q4H PRN Howard Spencer PA-C   10 mg at 08/22/23 0253    ondansetron (ZOFRAN-ODT) disintegrating tablet 4 mg  4 mg Oral Q8H PRN Howard Spencer PA-C        Or    ondansetron [facility]) injection 4 mg  4 mg IntraVENous Q6H PRN Howard Spencer PA-C   4 mg at 08/21/23 2246    sennosides-docusate sodium (SENOKOT-S) 8.6-50 MG tablet 1 tablet  1 tablet Oral BID Howard Spencer PA-C   1 tablet at 08/22/23 0920    polyethylene glycol (GLYCOLAX) packet 17 g  17 g Oral Daily PRN Howard Spencer PA-C        aspirin EC tablet 81 mg  81 mg Oral BID Howard Spencer PA-C   81 mg at 08/22/23 9445       Physical Examination:
pt requires total physical assistance to accomplish the task

## 2023-08-24 ENCOUNTER — PATIENT MESSAGE (OUTPATIENT)
Dept: ORTHOPEDIC SURGERY | Age: 58
End: 2023-08-24

## 2023-08-24 DIAGNOSIS — Z96.651 S/P TOTAL KNEE ARTHROPLASTY, RIGHT: Primary | ICD-10-CM

## 2023-08-29 ENCOUNTER — OFFICE VISIT (OUTPATIENT)
Dept: UROLOGY | Age: 58
End: 2023-08-29
Payer: COMMERCIAL

## 2023-08-29 ENCOUNTER — TELEPHONE (OUTPATIENT)
Dept: UROLOGY | Age: 58
End: 2023-08-29

## 2023-08-29 ENCOUNTER — TELEPHONE (OUTPATIENT)
Dept: ORTHOPEDIC SURGERY | Age: 58
End: 2023-08-29

## 2023-08-29 VITALS
SYSTOLIC BLOOD PRESSURE: 136 MMHG | HEIGHT: 71 IN | DIASTOLIC BLOOD PRESSURE: 86 MMHG | WEIGHT: 235 LBS | HEART RATE: 67 BPM | BODY MASS INDEX: 32.9 KG/M2

## 2023-08-29 DIAGNOSIS — R33.9 URINARY RETENTION: Primary | ICD-10-CM

## 2023-08-29 PROCEDURE — 99203 OFFICE O/P NEW LOW 30 MIN: CPT | Performed by: PHYSICIAN ASSISTANT

## 2023-08-29 PROCEDURE — G8417 CALC BMI ABV UP PARAM F/U: HCPCS | Performed by: PHYSICIAN ASSISTANT

## 2023-08-29 PROCEDURE — 1036F TOBACCO NON-USER: CPT | Performed by: PHYSICIAN ASSISTANT

## 2023-08-29 PROCEDURE — G8427 DOCREV CUR MEDS BY ELIG CLIN: HCPCS | Performed by: PHYSICIAN ASSISTANT

## 2023-08-29 PROCEDURE — 3017F COLORECTAL CA SCREEN DOC REV: CPT | Performed by: PHYSICIAN ASSISTANT

## 2023-08-29 RX ORDER — TIZANIDINE 4 MG/1
4 TABLET ORAL 2 TIMES DAILY PRN
Qty: 30 TABLET | Refills: 0 | Status: SHIPPED | OUTPATIENT
Start: 2023-08-29 | End: 2024-08-28

## 2023-08-29 RX ORDER — IBUPROFEN 800 MG/1
800 TABLET ORAL EVERY 8 HOURS PRN
Qty: 60 TABLET | Refills: 0 | Status: SHIPPED | OUTPATIENT
Start: 2023-08-29

## 2023-08-29 ASSESSMENT — ENCOUNTER SYMPTOMS: APNEA: 0

## 2023-08-29 NOTE — TELEPHONE ENCOUNTER
[place] paperwork completed and faxed to [facility] [phone] on 8/29/2023.  Patient contacted and aware

## 2023-08-29 NOTE — TELEPHONE ENCOUNTER
Patient asking for something for pain. Patient states he does NOT want another Narcotic medication.  Please advise patient uses [facility].

## 2023-08-29 NOTE — PROGRESS NOTES
Subjective:      Patient ID: Henri Sullivan is a 56 y.o. male    HPI  56year old male who had a angel catheter place on 8/22/23 after going into urinary retention following an orthopaedic surgery. He presents today for a voiding trial.      No past medical history on file. Past Surgical History:   Procedure Laterality Date    HERNIA REPAIR      TOTAL KNEE ARTHROPLASTY Right 07/2023    VASECTOMY       Social History     Socioeconomic History    Marital status:      Spouse name: None    Number of children: None    Years of education: None    Highest education level: None   Tobacco Use    Smoking status: Never    Smokeless tobacco: Never   Substance and Sexual Activity    Alcohol use: Yes     Alcohol/week: 2.0 standard drinks     Types: 1 Cans of beer, 1 Shots of liquor per week    Drug use: Never     No family history on file. Current Outpatient Medications   Medication Sig Dispense Refill    ibuprofen (ADVIL;MOTRIN) 800 MG tablet Take 1 tablet by mouth every 8 hours as needed for Pain 60 tablet 0    tiZANidine (ZANAFLEX) 4 MG tablet Take 1 tablet by mouth 2 times daily as needed (Muscle spasms) 30 tablet 0    aspirin 81 MG EC tablet Take 1 tablet by mouth 2 times daily 60 tablet 0    ondansetron (ZOFRAN-ODT) 4 MG disintegrating tablet Take 1 tablet by mouth every 8 hours as needed for Nausea or Vomiting 21 tablet 0    sennosides-docusate sodium (SENOKOT-S) 8.6-50 MG tablet Take 1 tablet by mouth 2 times daily for 10 days 20 tablet 0    Misc. Devices (CLASSICS ROLLING WALKER) MISC Dispense 1 rolling walker 1 each 0    Misc. Devices (BATH/SHOWER SEAT) MISC Dispense 1 Shower/Bath seat 1 each 0    Misc. Devices (RAISED TOILET SEAT) MISC Dispense 1 raised toilet seat 1 each 0     No current facility-administered medications for this visit. Patient has no known allergies. reviewed      Review of Systems   Constitutional:  Negative for fatigue and fever. HENT:  Negative for congestion.     Respiratory:

## 2023-08-29 NOTE — TELEPHONE ENCOUNTER
I called the patient. He is going to slowly wean himself off of oxycodone. He will stop the meloxicam and begin taking ibuprofen.   He did ask for a refill on the Zanaflex

## 2023-08-30 DIAGNOSIS — Z96.651 S/P TOTAL KNEE ARTHROPLASTY, RIGHT: ICD-10-CM

## 2023-08-30 RX ORDER — OXYCODONE HYDROCHLORIDE 5 MG/1
5 TABLET ORAL EVERY 8 HOURS PRN
Qty: 21 TABLET | Refills: 0 | Status: SHIPPED | OUTPATIENT
Start: 2023-08-30 | End: 2023-09-06

## 2023-08-30 RX ORDER — SENNA AND DOCUSATE SODIUM 50; 8.6 MG/1; MG/1
1 TABLET, FILM COATED ORAL 2 TIMES DAILY
Qty: 20 TABLET | Refills: 0 | Status: CANCELLED | OUTPATIENT
Start: 2023-08-30 | End: 2023-09-09

## 2023-08-30 RX ORDER — SENNA AND DOCUSATE SODIUM 50; 8.6 MG/1; MG/1
1 TABLET, FILM COATED ORAL 2 TIMES DAILY
Qty: 20 TABLET | Refills: 0 | Status: SHIPPED | OUTPATIENT
Start: 2023-08-30 | End: 2023-09-09

## 2023-08-30 RX ORDER — OXYCODONE HYDROCHLORIDE 5 MG/1
5 TABLET ORAL EVERY 6 HOURS PRN
Qty: 24 TABLET | Refills: 0 | Status: CANCELLED | OUTPATIENT
Start: 2023-08-30 | End: 2023-09-05

## 2023-08-30 NOTE — TELEPHONE ENCOUNTER
From: Janice Suarez  To: Dr. Vipin Simpson  Sent: 8/24/2023 4:31 AM EDT  Subject: Knee    Good Morning. I have a catherder til Tuesday. Leg is extremely swollen. Am i retaining water in my right leg? I sent a picture.  . Please give me a call [phone]

## 2023-08-30 NOTE — TELEPHONE ENCOUNTER
[facility] Paperwork     Date Recived:08/30/2023  Date completed and awaiting provider signature: need rebecca signature  Date signed and faxed:   Date scanned into chart:   Faxed to:       Provider  [] Rodolfo Gannon  [] Yanick Cason  [x] Joan Elizabeth  [] Talia Ellison  [] Jerad Carrasquillo  [] Misael Mckinnon  [] Taylor Alberto  [] Marcial Luevano  [] Xuan Montague  [] Freda Morrell   [] Vinay castillo PA-C  [] Lani Ramirez PA-C         Need rebecca signature copy handed to jose.

## 2023-08-31 ENCOUNTER — TELEPHONE (OUTPATIENT)
Dept: UROLOGY | Age: 58
End: 2023-08-31

## 2023-08-31 ENCOUNTER — TELEPHONE (OUTPATIENT)
Dept: ORTHOPEDIC SURGERY | Age: 58
End: 2023-08-31

## 2023-08-31 NOTE — TELEPHONE ENCOUNTER
Patient called and stated that he had a catheter removed on Tues and some gross hematuria today.  He would like you to call and let him know what he should be watching out for and what would be something that he would need to head to the ED

## 2023-09-01 ENCOUNTER — OFFICE VISIT (OUTPATIENT)
Dept: ORTHOPEDIC SURGERY | Age: 58
End: 2023-09-01

## 2023-09-01 VITALS
BODY MASS INDEX: 33.65 KG/M2 | HEIGHT: 71 IN | WEIGHT: 240.4 LBS | HEART RATE: 79 BPM | OXYGEN SATURATION: 98 % | TEMPERATURE: 97.1 F

## 2023-09-01 DIAGNOSIS — Z96.651 S/P TOTAL KNEE ARTHROPLASTY, RIGHT: Primary | ICD-10-CM

## 2023-09-01 PROCEDURE — 99024 POSTOP FOLLOW-UP VISIT: CPT | Performed by: PHYSICIAN ASSISTANT

## 2023-09-01 RX ORDER — TAMSULOSIN HYDROCHLORIDE 0.4 MG/1
0.4 CAPSULE ORAL DAILY
Qty: 30 CAPSULE | Refills: 0 | Status: SHIPPED | OUTPATIENT
Start: 2023-09-01

## 2023-09-01 NOTE — PROGRESS NOTES
Narrative Referring Provider:   Debbie Oakes      PCP:   Joseph Mcintyre MD    ============================  IMPRESSION/PLAN:  ============================  Letty Wadsworth is s/p right Total knee replacement completed on 2023. IMPRESSION: Excellent early outcome and patient had a catheter postoperatively. He is dealing with urinary retention issues    PLAN:  Rest, Ice, Compression, Elevation PRN. , Continue physical therapy. , and Modify medical management:  Patient will begin taking Flomax 1 tablet daily  Routine follow-up. Ice compression and elevation  Patient Reassurance: Normal post-operative course discussed with patient. Patient reassured and supported. All questions answered. Follow up 2-week no X-Rays Needed    Patient presents today for a a routine 1st post-op visit    Status post op:  right Total knee replacement     BMI: There is no height or weight on file to calculate BMI. Post-operative recovery was complicated by uneventful/none. Patient rates their condition as improving. Does the patient still experience pain? 2/10 pain, aching    Post Op discharge patient location: in home. Functional Assessment is as follows: is ready to begin outpatient PT. Functional difficulties: None. Pain Medication: Tylenol, rare oxycodone  Currently Ambulating with: a cane    =================================  EXAM: POST OP KNEE  =================================  Right Post-Operative Knee    Ambulates with a limp favoring the right. SKIN: Appropriate postop appearance, No evidence of erythema, warmth, discharge or drainage and Incision clean/dry/intact. Range of motion is 4 degrees in extension and   95 degrees of flexion. Extension La degrees    Pain with ROM: Yes with deeper flexion    There is Mild effusion.      Mal-alignment: No    Tender to the palpation of Medial femoral condyle and Medial joint line    Neurovascular Status: Sensation Intact, Moves foot and

## 2023-09-07 DIAGNOSIS — Z96.651 S/P TOTAL KNEE ARTHROPLASTY, RIGHT: Primary | ICD-10-CM

## 2023-09-07 RX ORDER — OXYCODONE HYDROCHLORIDE 5 MG/1
5 TABLET ORAL EVERY 8 HOURS PRN
Qty: 21 TABLET | Refills: 0 | Status: SHIPPED | OUTPATIENT
Start: 2023-09-07 | End: 2023-09-14

## 2023-09-13 ENCOUNTER — TELEPHONE (OUTPATIENT)
Dept: ORTHOPEDIC SURGERY | Age: 58
End: 2023-09-13

## 2023-09-13 NOTE — TELEPHONE ENCOUNTER
The order for physical therapy as already been placed. I called the patient. I gave him the number to call to set up outpatient therapy.

## 2023-09-13 NOTE — TELEPHONE ENCOUNTER
Call is from [facility] [address]. Patient being d/c from home care today. Caller asking for ambulatory physical therapy to be place. S/P total knee arthroplasty, right. Please advise.

## 2023-09-14 ENCOUNTER — HOSPITAL ENCOUNTER (OUTPATIENT)
Dept: PHYSICAL THERAPY | Age: 58
Setting detail: THERAPIES SERIES
Discharge: HOME | End: 2023-09-14
Payer: COMMERCIAL

## 2023-09-14 PROCEDURE — 97161 PT EVAL LOW COMPLEX 20 MIN: CPT

## 2023-09-14 ASSESSMENT — PAIN SCALES - GENERAL: PAINLEVEL_OUTOF10: 4

## 2023-09-14 ASSESSMENT — PAIN DESCRIPTION - ORIENTATION: ORIENTATION: RIGHT

## 2023-09-14 ASSESSMENT — PAIN DESCRIPTION - DESCRIPTORS: DESCRIPTORS: ACHING

## 2023-09-14 ASSESSMENT — PAIN DESCRIPTION - PAIN TYPE: TYPE: SURGICAL PAIN

## 2023-09-14 ASSESSMENT — PAIN DESCRIPTION - LOCATION: LOCATION: KNEE

## 2023-09-14 NOTE — PROGRESS NOTES
[facility]  PHYSICAL THERAPY EVALUATION      Physical Therapy: Initial Evaluation    Patient: Adrienne Daly (66 y.o.     male)   Examination Date: 2023   :  1965 ;    Confirmed: Yes MRN: [id number]  CSN: [id number]   Insurance: Payor: [employer] / Plan: MEDICAL M[employer]UAL ACCESS / Product Type: *No Product type* /   Insurance ID: [id number] - (Commercial)    Secondary Insurance (if applicable):     Referring Physician: VIVIAN Engel       Visits to Date/Visits Approved:     No Show/Cancelled Appts: 0 / 0     Medical Diagnosis: S/P total knee arthroplasty, right [Z96.651]        Treatment Diagnosis: R knee pain, decreased R knee ROM and strength, impaired balance, impaired gait and functional mobility     PERTINENT MEDICAL HISTORY   Patient Assessed for Rehabilitation Services: Yes       Medical History: Chart Reviewed: Yes No past medical history on file. Surgical History:   Past Surgical History:   Procedure Laterality Date    HERNIA REPAIR      TOTAL KNEE ARTHROPLASTY Right 2023    TOTAL KNEE ARTHROPLASTY Right 2023    Right total knee arthroplasty performed by Gopal Martin MD at [address] [place] [address]         Medications:   Current Outpatient Medications:     oxyCODONE (ROXICODONE) 5 MG immediate release tablet, Take 1 tablet by mouth every 8 hours as needed for Pain for up to 7 days. Intended supply: 7 days.  Take lowest dose possible to manage pain Max Daily Amount: 15 mg, Disp: 21 tablet, Rfl: 0    tamsulosin (FLOMAX) 0.4 MG capsule, Take 1 capsule by mouth daily, Disp: 30 capsule, Rfl: 0    ibuprofen (ADVIL;MOTRIN) 800 MG tablet, Take 1 tablet by mouth every 8 hours as needed for Pain, Disp: 60 tablet, Rfl: 0    tiZANidine (ZANAFLEX) 4 MG tablet, Take 1 tablet by mouth 2 times daily as needed (Muscle spasms), Disp: 30 tablet, Rfl: 0    aspirin 81 MG EC tablet, Take 1 tablet by mouth 2 times daily, Disp: 60 tablet, Rfl: 0

## 2023-09-14 NOTE — PROGRESS NOTES
[facility]  PHYSICAL THERAPY PLAN OF CARE   [facility] [place], [facility]         Phone: [phone]  Fax: [phone]    [] Certification  [] Recertification [x]  Plan of Care  [] Progress Note [] Discharge      Referring Provider: VIVIAN Mederos     From:  Joe Smalls, PT, DPT  Patient: Antoine Cuba (69 y.o. male) : 1965 Date: 2023  Medical Diagnosis: S/P total knee arthroplasty, right [Z96.651]       Treatment Diagnosis: R knee pain, decreased R knee ROM and strength, impaired balance, impaired gait and functional mobility    Plan of Care/Certification Expiration Date:     Progress Report Period from:  2023  to 2023    Visits to Date: 1 No Show: 0 Cancelled Appts: 0    OBJECTIVE:   Short Term Goals - Time Frame for Short Term Goals: 4 weeks    Goals Current/Discharge status  Status   Short Term Goal 1: R knee PROM consistently 0-115 deg     PROM RLE (degrees)  RLE General PROM: knee: -5 to 105 deg     New   Short Term Goal 2: SLS balance > 20\" radha with improved ankle and hip strategies   RLE SLS ~5 sec  LLE SLS ~10 sec   New     Long Term Goals - Time Frame for Long Term Goals : 6-8 weeks  Goals Current/ Discharge status Status   Long Term Goal 1: R knee AROM consistently 0-120+ deg to normalize functional movement patterns  Knee: -5 to 100 deg (pain with deeper flexion)   New   Long Term Goal 2: LE strength 5/5 with ability to perform full knee AROM and reciprocal stair negotiaion for at least 1 flight of steps   Knee: 3+/5 (unable to concentrically/eccentrically load with full body weight)   New   Long Term Goal 3: Gait unrestricted community distances with normalized gait mechanics and symmetrical step length  Cane; antalgic gait with knee flexion during stance phase   New   Long Term Goal 4: 90% PLOF or better via functional report or outcome survey  Exam: LEFS: 41/80  50% PLOF on intake   New   Long Term Goal 5:  Independent with recommended

## 2023-09-18 ENCOUNTER — HOSPITAL ENCOUNTER (OUTPATIENT)
Dept: PHYSICAL THERAPY | Age: 58
Setting detail: THERAPIES SERIES
Discharge: HOME | End: 2023-09-18
Payer: COMMERCIAL

## 2023-09-18 PROCEDURE — 97110 THERAPEUTIC EXERCISES: CPT

## 2023-09-18 PROCEDURE — 97016 VASOPNEUMATIC DEVICE THERAPY: CPT

## 2023-09-18 ASSESSMENT — PAIN DESCRIPTION - ORIENTATION: ORIENTATION: RIGHT

## 2023-09-18 ASSESSMENT — PAIN DESCRIPTION - PAIN TYPE: TYPE: SURGICAL PAIN

## 2023-09-18 ASSESSMENT — PAIN DESCRIPTION - DESCRIPTORS: DESCRIPTORS: ACHING

## 2023-09-18 ASSESSMENT — PAIN DESCRIPTION - LOCATION: LOCATION: KNEE

## 2023-09-18 ASSESSMENT — PAIN SCALES - GENERAL: PAINLEVEL_OUTOF10: 3

## 2023-09-18 NOTE — PROGRESS NOTES
[facility]  Outpatient Physical Therapy    Treatment Note        Date: 2023  Patient: Elise Meyer  : 1965   Confirmed: Yes  MRN: [id number]  Referring Provider: VIVIAN Renner    Medical Diagnosis: S/P total knee arthroplasty, right [Z96.651]       Treatment Diagnosis: R knee pain, decreased R knee ROM and strength, impaired balance, impaired gait and functional mobility    Visit Information:  Insurance: Payor: [employer] / Plan: MEDICAL MUTUAL ACCESS / Product Type: *No Product type* /   PT Visit Information  Onset Date: 23  Total # of Visits Approved: 99  Total # of Visits to Date: 2  No Show: 0  Canceled Appointment: 0  Progress Note Counter:     Subjective Information:  Subjective: I'm hoping I am cleared by the MD to go into the pool later  HEP Compliance:  [x] Good [] Fair [] Poor [] Reports not doing due to:    Pain Screening  Patient Currently in Pain: Yes  Pain Level: 3  Pain Type: Surgical pain  Pain Location: Knee  Pain Orientation: Right  Pain Descriptors: Aching    Treatment:  Exercises:  Exercises  Exercise 1: bike star trac: 5 minutes  S9  Exercise 2: DKTC w/ ball: 10 x 10\"  Exercise 3: quad setting / TKEs x 10 x 5\"  Exercise 4: quad set SLR x 10  Exercise 5: *hamstring, calf stretches  Exercise 6: balance / gait normalization training:NBOS (eo/ec) tandem radha ant 30\". Rocker board A/P/L x 15 taps  Exercise 7: * step ups (progress within tolerance)  Exercise 8: * step downs (progress within tolerance)        Modalities:  Vasopneumatic Device (CPT A3404124)  Patient Position: Supine  Vasopneumatic Specified Location: RLE  Pre-Girth Measurement: 46  Post-Girth Measurement: 44.5  Post treatment skin assessment: Intact  Limitations addressed: Pain modulation, Edema  Therapist provided: Assistance  Functional ability(s) targeted:  Tolerance to age appropriate activities  Untimed (minutes): 10       *Indicates exercise, modality, or manual

## 2023-09-19 ENCOUNTER — OFFICE VISIT (OUTPATIENT)
Dept: ORTHOPEDIC SURGERY | Age: 58
End: 2023-09-19

## 2023-09-19 VITALS — WEIGHT: 240 LBS | BODY MASS INDEX: 33.6 KG/M2 | HEIGHT: 71 IN

## 2023-09-19 DIAGNOSIS — Z96.651 S/P TOTAL KNEE ARTHROPLASTY, RIGHT: Primary | ICD-10-CM

## 2023-09-19 PROCEDURE — 99024 POSTOP FOLLOW-UP VISIT: CPT | Performed by: PHYSICIAN ASSISTANT

## 2023-09-19 RX ORDER — OXYCODONE HYDROCHLORIDE 5 MG/1
5 TABLET ORAL 2 TIMES DAILY PRN
Qty: 14 TABLET | Refills: 0 | Status: SHIPPED | OUTPATIENT
Start: 2023-09-19 | End: 2023-09-26

## 2023-09-19 RX ORDER — IBUPROFEN 800 MG/1
800 TABLET ORAL EVERY 8 HOURS PRN
Qty: 60 TABLET | Refills: 0 | Status: SHIPPED | OUTPATIENT
Start: 2023-09-19

## 2023-09-19 RX ORDER — TIZANIDINE 4 MG/1
4 TABLET ORAL 2 TIMES DAILY PRN
Qty: 30 TABLET | Refills: 0 | Status: SHIPPED | OUTPATIENT
Start: 2023-09-19 | End: 2024-09-18

## 2023-09-19 NOTE — PROGRESS NOTES
Narrative Referring Provider:   Debbie Oakes      PCP:   Joseph Mcintyre MD    ============================  IMPRESSION/PLAN:  ============================  Letty Wadsworth is s/p right Total knee replacement completed on 2020. IMPRESSION: No complaints or limitations and At normal postoperative stage of recovery    PLAN:  No new treatment indicated. , Rest, Ice, Compression, Elevation PRN. , and Continue physical therapy. Routine follow-up. Ice compression and elevation  Patient Reassurance: Normal post-operative course discussed with patient. Patient reassured and supported. All questions answered. Follow up 1 months No X-Rays Needed    Patient presents today for a a routine 1st post-op visit    Status post op:  right Total knee replacement     BMI: There is no height or weight on file to calculate BMI. Post-operative recovery was complicated by uneventful/none. Patient rates their condition as improving. Does the patient still experience pain? 2/10 pain, aching    Post Op discharge patient location: in home. Functional Assessment is as follows: Has commenced outpatient PT. Functional difficulties: None. Pain Medication: Tylenol, rare oxycodone  Currently Ambulating with: a cane    =================================  EXAM: POST OP KNEE  =================================  Right Post-Operative Knee    Ambulates with a limp favoring the right. SKIN: Appropriate postop appearance, No evidence of erythema, warmth, discharge or drainage and Incision clean/dry/intact. Range of motion is 0 degrees in extension and   100 degrees of flexion. Extension La degrees    Pain with ROM: Yes with deeper flexion    There is Mild effusion.      Mal-alignment: No    Tender to the palpation of Medial femoral condyle and Medial joint line    Neurovascular Status: Sensation Intact, Moves foot and ankle up & down, 2+ dorsalis pedis and negative homans sign    Stability:Varus/Valgus-

## 2023-09-21 ENCOUNTER — HOSPITAL ENCOUNTER (OUTPATIENT)
Dept: PHYSICAL THERAPY | Age: 58
Setting detail: THERAPIES SERIES
Discharge: HOME | End: 2023-09-21
Payer: COMMERCIAL

## 2023-09-21 PROCEDURE — 97110 THERAPEUTIC EXERCISES: CPT

## 2023-09-21 ASSESSMENT — PAIN DESCRIPTION - ORIENTATION: ORIENTATION: RIGHT

## 2023-09-21 ASSESSMENT — PAIN DESCRIPTION - DESCRIPTORS: DESCRIPTORS: SORE;ACHING

## 2023-09-21 ASSESSMENT — PAIN SCALES - GENERAL: PAINLEVEL_OUTOF10: 5

## 2023-09-21 ASSESSMENT — PAIN DESCRIPTION - PAIN TYPE: TYPE: SURGICAL PAIN

## 2023-09-21 ASSESSMENT — PAIN DESCRIPTION - LOCATION: LOCATION: KNEE

## 2023-09-21 NOTE — PROGRESS NOTES
Modalities, Therapeutic activities  Modalities: Heat/Cold, Vasopneumatic Device  Pt to continue current HEP. See objective section for any therapeutic exercise changes, additions or modifications this date.     Therapy Time:      PT Individual Minutes  Time In: 9323  Time Out: 0386  Minutes: 48  Timed Code Treatment Minutes: 48 Minutes  Procedure Minutes: 0  Timed Activity Minutes Units   Ther Ex 48 3     Electronically signed by Darius Rooney PTA on 9/21/23 at 9:42 AM EDT

## 2023-09-25 ENCOUNTER — HOSPITAL ENCOUNTER (OUTPATIENT)
Dept: PHYSICAL THERAPY | Age: 58
Setting detail: THERAPIES SERIES
Discharge: HOME | End: 2023-09-25
Payer: COMMERCIAL

## 2023-09-25 PROCEDURE — 97110 THERAPEUTIC EXERCISES: CPT

## 2023-09-25 ASSESSMENT — PAIN SCALES - GENERAL: PAINLEVEL_OUTOF10: 5

## 2023-09-25 ASSESSMENT — PAIN DESCRIPTION - PAIN TYPE: TYPE: SURGICAL PAIN

## 2023-09-25 ASSESSMENT — PAIN DESCRIPTION - DESCRIPTORS: DESCRIPTORS: SORE;ACHING

## 2023-09-25 ASSESSMENT — PAIN DESCRIPTION - ORIENTATION: ORIENTATION: RIGHT

## 2023-09-25 ASSESSMENT — PAIN DESCRIPTION - LOCATION: LOCATION: KNEE

## 2023-09-25 NOTE — PROGRESS NOTES
to continue current HEP. See objective section for any therapeutic exercise changes, additions or modifications this date.     Therapy Time:      PT Individual Minutes  Time In: 6880  Time Out: 6540  Minutes: 45  Timed Code Treatment Minutes: 45 Minutes  Procedure Minutes: 0   Timed Activity Minutes Units   Ther Ex 45 3   Electronically signed by Pat Funez PTA on 9/25/23 at 8:54 AM EDT  Amended: Electronically signed by Pat Funez PTA on 9/28/2023 at 8:39 AM

## 2023-09-28 ENCOUNTER — HOSPITAL ENCOUNTER (OUTPATIENT)
Dept: PHYSICAL THERAPY | Age: 58
Setting detail: THERAPIES SERIES
Discharge: HOME | End: 2023-09-28
Payer: COMMERCIAL

## 2023-09-28 PROCEDURE — 97110 THERAPEUTIC EXERCISES: CPT

## 2023-09-28 ASSESSMENT — PAIN DESCRIPTION - PAIN TYPE: TYPE: SURGICAL PAIN

## 2023-09-28 ASSESSMENT — PAIN SCALES - GENERAL: PAINLEVEL_OUTOF10: 3

## 2023-09-28 ASSESSMENT — PAIN DESCRIPTION - ORIENTATION: ORIENTATION: RIGHT

## 2023-09-28 ASSESSMENT — PAIN DESCRIPTION - DESCRIPTORS: DESCRIPTORS: TIGHTNESS

## 2023-09-28 ASSESSMENT — PAIN DESCRIPTION - LOCATION: LOCATION: KNEE

## 2023-09-28 NOTE — PROGRESS NOTES
[facility]  Outpatient Physical Therapy    Treatment Note        Date: 2023  Patient: Santosh Damon  : 1965   Confirmed: Yes  MRN: [id number]  Referring Provider: VIVIAN Mulligan    Medical Diagnosis: S/P total knee arthroplasty, right [Z96.651]       Treatment Diagnosis: R knee pain, decreased R knee ROM and strength, impaired balance, impaired gait and functional mobility    Visit Information:  Insurance: Payor: [employer] / Plan: MEDICAL MUTUAL ACCESS / Product Type: *No Product type* /   PT Visit Information  Onset Date: 23  Total # of Visits Approved: 99  Total # of Visits to Date: 5  No Show: 0  Canceled Appointment: 0  Progress Note Counter:     Subjective Information:  Subjective: Pt denies feeling pain in his knee but is feeling a constant tightness. HEP Compliance:  [x] Good [] Fair [] Poor [] Reports not doing due to:    Pain Screening  Patient Currently in Pain: Yes  Pain Assessment: 0-10  Pain Level: 3  Pain Type: Surgical pain  Pain Location: Knee  Pain Orientation: Right  Pain Descriptors: Tightness    Treatment:  Exercises:  Exercises  Exercise 1: bike star trac: 6 minutes, level 3, S9  Exercise 2: DKTC w/ ball: 10 x 10\"  Exercise 5: hamstring, calf stretches  4 x 30\"  Exercise 7: Step to pattern on 8\" stairs x4 steps, 5 trials with empahsis on eccentric control  Exercise 8: step downs from 8\" stair with focus on eccentric control 6x with 10\" hold     Modalities:   Vasopneumatic Device (CPT [id number])  Patient Position: Supine  Vasopneumatic Specified Location: RLE  Pre-Girth Measurement: 45  Post-Girth Measurement: 43  Post treatment skin assessment: Intact  Limitations addressed: Pain modulation, Edema  Therapist provided: Assistance  Functional ability(s) targeted:  Tolerance to age appropriate activities  Untimed (minutes): 10    *Indicates exercise, modality, or manual techniques to be initiated when appropriate    Objective Measures:

## 2023-09-29 ENCOUNTER — OFFICE VISIT (OUTPATIENT)
Dept: UROLOGY | Age: 58
End: 2023-09-29
Payer: COMMERCIAL

## 2023-09-29 VITALS
SYSTOLIC BLOOD PRESSURE: 104 MMHG | DIASTOLIC BLOOD PRESSURE: 76 MMHG | OXYGEN SATURATION: 98 % | HEART RATE: 84 BPM | HEIGHT: 71 IN | WEIGHT: 236 LBS | BODY MASS INDEX: 33.04 KG/M2

## 2023-09-29 DIAGNOSIS — R33.9 URINARY RETENTION: Primary | ICD-10-CM

## 2023-09-29 PROCEDURE — G8427 DOCREV CUR MEDS BY ELIG CLIN: HCPCS | Performed by: PHYSICIAN ASSISTANT

## 2023-09-29 PROCEDURE — 3017F COLORECTAL CA SCREEN DOC REV: CPT | Performed by: PHYSICIAN ASSISTANT

## 2023-09-29 PROCEDURE — G8417 CALC BMI ABV UP PARAM F/U: HCPCS | Performed by: PHYSICIAN ASSISTANT

## 2023-09-29 PROCEDURE — 1036F TOBACCO NON-USER: CPT | Performed by: PHYSICIAN ASSISTANT

## 2023-09-29 PROCEDURE — 99203 OFFICE O/P NEW LOW 30 MIN: CPT | Performed by: PHYSICIAN ASSISTANT

## 2023-09-29 RX ORDER — SENNOSIDES A AND B 8.6 MG/1
1 TABLET, FILM COATED ORAL DAILY
COMMUNITY

## 2023-09-29 RX ORDER — OXYCODONE HYDROCHLORIDE AND ACETAMINOPHEN 5; 325 MG/1; MG/1
1 TABLET ORAL EVERY 4 HOURS PRN
COMMUNITY

## 2023-09-29 RX ORDER — TAMSULOSIN HYDROCHLORIDE 0.4 MG/1
0.4 CAPSULE ORAL DAILY
Qty: 90 CAPSULE | Refills: 2 | Status: SHIPPED | OUTPATIENT
Start: 2023-09-29 | End: 2024-06-25

## 2023-09-29 RX ORDER — DOCUSATE SODIUM 100 MG/1
100 CAPSULE, LIQUID FILLED ORAL 2 TIMES DAILY
COMMUNITY
End: 2023-09-29 | Stop reason: CLARIF

## 2023-09-29 ASSESSMENT — ENCOUNTER SYMPTOMS: APNEA: 0

## 2023-09-29 NOTE — PROGRESS NOTES
Subjective:      Patient ID: Antoine Cuba is a 56 y.o. male    HPI  56year old male who presents as a follow up appointment for urinary retention follow an orthopaedic surgery. He had a angel catheter placed and had a successful voiding trial on 8/22/23. State that he feels empty after urination. He is currently on flomax, but would like to see how he does without it. Max flow 7 ml/s, average flow 3 ml/s, PVR 53 mls of urine    No past medical history on file. Past Surgical History:   Procedure Laterality Date    HERNIA REPAIR      TOTAL KNEE ARTHROPLASTY Right 07/2023    TOTAL KNEE ARTHROPLASTY Right 8/21/2023    Right total knee arthroplasty performed by Ruthie Quiñones MD at [address] History     Socioeconomic History    Marital status:      Spouse name: None    Number of children: None    Years of education: None    Highest education level: None   Tobacco Use    Smoking status: Never    Smokeless tobacco: Never   Substance and Sexual Activity    Alcohol use: Yes     Alcohol/week: 2.0 standard drinks of alcohol     Types: 1 Cans of beer, 1 Shots of liquor per week    Drug use: Never     No family history on file. Current Outpatient Medications   Medication Sig Dispense Refill    oxyCODONE-acetaminophen (PERCOCET) 5-325 MG per tablet Take 1 tablet by mouth every 4 hours as needed for Pain. Max Daily Amount: 6 tablets      senna (SENOKOT) 8.6 MG tablet Take 1 tablet by mouth daily      ibuprofen (ADVIL;MOTRIN) 800 MG tablet Take 1 tablet by mouth every 8 hours as needed for Pain 60 tablet 0    tiZANidine (ZANAFLEX) 4 MG tablet Take 1 tablet by mouth 2 times daily as needed (Muscle spasms) 30 tablet 0    tamsulosin (FLOMAX) 0.4 MG capsule Take 1 capsule by mouth daily 30 capsule 0    aspirin 81 MG EC tablet Take 1 tablet by mouth 2 times daily 60 tablet 0    Misc. Devices (CLASSICS ROLLING WALKER) MISC Dispense 1 rolling walker 1 each 0    Misc.  Devices (BATH/SHOWER SEAT) MISC

## 2023-10-02 ENCOUNTER — HOSPITAL ENCOUNTER (OUTPATIENT)
Dept: PHYSICAL THERAPY | Age: 58
Setting detail: THERAPIES SERIES
Discharge: HOME | End: 2023-10-02
Payer: COMMERCIAL

## 2023-10-02 PROCEDURE — 97016 VASOPNEUMATIC DEVICE THERAPY: CPT

## 2023-10-02 PROCEDURE — 97110 THERAPEUTIC EXERCISES: CPT

## 2023-10-02 ASSESSMENT — PAIN DESCRIPTION - PAIN TYPE: TYPE: SURGICAL PAIN

## 2023-10-02 ASSESSMENT — PAIN DESCRIPTION - ORIENTATION: ORIENTATION: RIGHT

## 2023-10-02 ASSESSMENT — PAIN SCALES - GENERAL: PAINLEVEL_OUTOF10: 3

## 2023-10-02 ASSESSMENT — PAIN DESCRIPTION - LOCATION: LOCATION: KNEE

## 2023-10-02 ASSESSMENT — PAIN DESCRIPTION - DESCRIPTORS: DESCRIPTORS: TIGHTNESS

## 2023-10-02 NOTE — PROGRESS NOTES
[facility]  Outpatient Physical Therapy    Treatment Note        Date: 10/2/2023  Patient: Juani Alfred  : 1965   Confirmed: Yes  MRN: [id number]  Referring Provider: VIVIAN Martínez    Medical Diagnosis: S/P total knee arthroplasty, right [Z96.651]       Treatment Diagnosis: R knee pain, decreased R knee ROM and strength, impaired balance, impaired gait and functional mobility    Visit Information:  Insurance: Payor: [employer] / Plan: MEDICAL MUTUAL ACCESS / Product Type: *No Product type* /   PT Visit Information  Onset Date: 23  Total # of Visits Approved: 99  Total # of Visits to Date: 6  No Show: 0  Canceled Appointment: 0  Progress Note Counter:     Subjective Information:  Subjective: I bumped my leg in the garages a couple small scratches but nothing bad. HEP Compliance:  [x] Good [] Fair [] Poor [] Reports not doing due to:    Pain Screening  Patient Currently in Pain: Yes  Pain Level: 3  Pain Type: Surgical pain  Pain Location: Knee  Pain Orientation: Right  Pain Descriptors: Tightness    Treatment:  Exercises:  Exercises  Exercise 1: bike star trac: 6 minutes, level 3, S9  Exercise 2: DKTC w/ ball: 10 x 10\"  Exercise 5: hamstring, calf stretches  4 x 30\", Knee flexion 1st stair x 5\" x 10  Exercise 7: Step to pattern on 8\" stairs x4 steps, 5 trials with empahsis on eccentric control  Exercise 8: step downs from 8\" stair with focus on eccentric control 6x with 10\" hold       Manual:           Modalities:  Vasopneumatic Device (CPT [id number])  Patient Position: Supine  Vasopneumatic Specified Location: RLE  Pre-Girth Measurement: 42  Post-Girth Measurement: 41.5  Post treatment skin assessment: Intact  Limitations addressed: Pain modulation, Edema  Therapist provided: Assistance  Functional ability(s) targeted:  Tolerance to age appropriate activities  Untimed (minutes): 10       *Indicates exercise, modality, or manual techniques to be initiated when

## 2023-10-03 RX ORDER — DOCUSATE SODIUM AND SENNOSIDES 8.6; 5 MG/1; MG/1
TABLET, FILM COATED ORAL
Qty: 20 TABLET | Refills: 0 | OUTPATIENT
Start: 2023-10-03

## 2023-10-05 ENCOUNTER — HOSPITAL ENCOUNTER (OUTPATIENT)
Dept: PHYSICAL THERAPY | Age: 58
Setting detail: THERAPIES SERIES
Discharge: HOME | End: 2023-10-05
Payer: COMMERCIAL

## 2023-10-05 PROCEDURE — 97110 THERAPEUTIC EXERCISES: CPT

## 2023-10-05 PROCEDURE — 97016 VASOPNEUMATIC DEVICE THERAPY: CPT

## 2023-10-05 ASSESSMENT — PAIN DESCRIPTION - ORIENTATION: ORIENTATION: RIGHT

## 2023-10-05 ASSESSMENT — PAIN DESCRIPTION - DESCRIPTORS: DESCRIPTORS: TENDER

## 2023-10-05 ASSESSMENT — PAIN DESCRIPTION - PAIN TYPE: TYPE: SURGICAL PAIN

## 2023-10-05 ASSESSMENT — PAIN DESCRIPTION - LOCATION: LOCATION: KNEE

## 2023-10-05 ASSESSMENT — PAIN SCALES - GENERAL: PAINLEVEL_OUTOF10: 4

## 2023-10-09 ENCOUNTER — NURSE ONLY (OUTPATIENT)
Dept: UROLOGY | Age: 58
End: 2023-10-09
Payer: COMMERCIAL

## 2023-10-09 ENCOUNTER — HOSPITAL ENCOUNTER (OUTPATIENT)
Dept: PHYSICAL THERAPY | Age: 58
Setting detail: THERAPIES SERIES
Discharge: HOME | End: 2023-10-09
Payer: COMMERCIAL

## 2023-10-09 ENCOUNTER — TELEPHONE (OUTPATIENT)
Dept: UROLOGY | Age: 58
End: 2023-10-09

## 2023-10-09 DIAGNOSIS — R20.8 BURNING SENSATION: Primary | ICD-10-CM

## 2023-10-09 LAB
BILIRUBIN, POC: NORMAL
BLOOD URINE, POC: NORMAL
CLARITY, POC: CLEAR
COLOR, POC: YELLOW
GLUCOSE URINE, POC: NORMAL
KETONES, POC: NORMAL
LEUKOCYTE EST, POC: NORMAL
NITRITE, POC: NORMAL
PH, POC: 6
PROTEIN, POC: NORMAL
SPECIFIC GRAVITY, POC: 1.02
UROBILINOGEN, POC: 0.2

## 2023-10-09 PROCEDURE — 97016 VASOPNEUMATIC DEVICE THERAPY: CPT

## 2023-10-09 PROCEDURE — 81003 URINALYSIS AUTO W/O SCOPE: CPT | Performed by: PHYSICIAN ASSISTANT

## 2023-10-09 PROCEDURE — 97110 THERAPEUTIC EXERCISES: CPT

## 2023-10-09 ASSESSMENT — PAIN DESCRIPTION - LOCATION: LOCATION: KNEE

## 2023-10-09 ASSESSMENT — PAIN SCALES - GENERAL: PAINLEVEL_OUTOF10: 3

## 2023-10-09 ASSESSMENT — PAIN DESCRIPTION - ORIENTATION: ORIENTATION: RIGHT

## 2023-10-09 ASSESSMENT — PAIN DESCRIPTION - DESCRIPTORS: DESCRIPTORS: ACHING

## 2023-10-09 ASSESSMENT — PAIN DESCRIPTION - PAIN TYPE: TYPE: SURGICAL PAIN

## 2023-10-09 NOTE — PROGRESS NOTES
Tolerance to age appropriate activities  Untimed (minutes): 10       *Indicates exercise, modality, or manual techniques to be initiated when appropriate    Objective Measures:             LTG 1 Current Status[de-identified] 10/9/23: R knee AROM 0-120                      Assessment: Body Structures, Functions, Activity Limitations Requiring Skilled Therapeutic Intervention: Increased pain, Decreased ROM, Decreased strength, Decreased functional mobility , Decreased ADL status, Decreased high-level IADLs, Decreased balance  Assessment: Pt demonstrates increased difficulty with 8 inch step ups this date, display increased effort needed. Improve tolerance with 6 inch step. Displays good SL balance with noemy step overs and demonstrates significant increases in AROM in the R knee. Treatment Diagnosis: R knee pain, decreased R knee ROM and strength, impaired balance, impaired gait and functional mobility  Therapy Prognosis: Excellent     Post-Pain Assessment:       Pain Rating (0-10 pain scale):  0 /10   Location and pain description same as pre-treatment unless indicated.    Action: [] NA   [x] Perform HEP  [] Meds as prescribed  [] Modalities as prescribed   [] Call Physician     GOALS   Patient Goal(s): Patient Goals : make leg stronger, return to PLOF    Short Term Goals Completed by 4 weeks Goal Status   STG 1 R knee PROM consistently 0-115 deg In progress   STG 2 SLS balance > 20\" radha with improved ankle and hip strategies In progress     Long Term Goals Completed by 6-8 weeks Goal Status   LTG 1 R knee AROM consistently 0-120+ deg to normalize functional movement patterns In progress, Partially met   LTG 2 LE strength 5/5 with ability to perform full knee AROM and reciprocal stair negotiaion for at least 1 flight of steps In progress   LTG 3 Gait unrestricted community distances with normalized gait mechanics and symmetrical step length In progress   LTG 4 90% PLOF or better via functional report or outcome survey In

## 2023-10-09 NOTE — TELEPHONE ENCOUNTER
Pt came to office with discomfort and burning at tip of penis. He past something that looks like a string. I collected a urine sample. Would you like it tested?

## 2023-10-09 NOTE — TELEPHONE ENCOUNTER
Urine sample showed  Color, UA yellow    Clarity, UA clear    Glucose, UA POC neg    Bilirubin, UA neg    Ketones, UA neg    Spec Grav, UA 1.025    Blood, UA POC neg    pH, UA 6.0    Protein, UA POC neg    Urobilinogen, UA 0.2    Leukocytes, UA neg    Nitrite, UA neg

## 2023-10-11 ENCOUNTER — OFFICE VISIT (OUTPATIENT)
Dept: UROLOGY | Age: 58
End: 2023-10-11
Payer: COMMERCIAL

## 2023-10-11 VITALS
HEART RATE: 77 BPM | BODY MASS INDEX: 32.76 KG/M2 | OXYGEN SATURATION: 97 % | SYSTOLIC BLOOD PRESSURE: 124 MMHG | DIASTOLIC BLOOD PRESSURE: 60 MMHG | WEIGHT: 234 LBS | HEIGHT: 71 IN

## 2023-10-11 DIAGNOSIS — N41.0 ACUTE PROSTATITIS: Primary | ICD-10-CM

## 2023-10-11 PROCEDURE — G8427 DOCREV CUR MEDS BY ELIG CLIN: HCPCS | Performed by: PHYSICIAN ASSISTANT

## 2023-10-11 PROCEDURE — G8484 FLU IMMUNIZE NO ADMIN: HCPCS | Performed by: PHYSICIAN ASSISTANT

## 2023-10-11 PROCEDURE — 3017F COLORECTAL CA SCREEN DOC REV: CPT | Performed by: PHYSICIAN ASSISTANT

## 2023-10-11 PROCEDURE — 1036F TOBACCO NON-USER: CPT | Performed by: PHYSICIAN ASSISTANT

## 2023-10-11 PROCEDURE — 99213 OFFICE O/P EST LOW 20 MIN: CPT | Performed by: PHYSICIAN ASSISTANT

## 2023-10-11 PROCEDURE — G8417 CALC BMI ABV UP PARAM F/U: HCPCS | Performed by: PHYSICIAN ASSISTANT

## 2023-10-11 RX ORDER — DOXYCYCLINE HYCLATE 100 MG
100 TABLET ORAL 2 TIMES DAILY
Qty: 60 TABLET | Refills: 0 | Status: SHIPPED | OUTPATIENT
Start: 2023-10-11 | End: 2023-11-10

## 2023-10-11 ASSESSMENT — ENCOUNTER SYMPTOMS: APNEA: 0

## 2023-10-11 NOTE — PROGRESS NOTES
Subjective:      Patient ID: Letty Wadsworth is a 56 y.o. male    HPI  56year old male who presents with complaints of having burning at the tip of his penis. States that it started on 10/6/23. He states that it is intermittent currently, sometimes not feeling it at all. He states when it started his urinary stream slip into two streams. That it resolved. The patient underwent surgery on 10/22/23 which required urinary catheterization. History reviewed. No pertinent past medical history. Past Surgical History:   Procedure Laterality Date    HERNIA REPAIR      TOTAL KNEE ARTHROPLASTY Right 07/2023    TOTAL KNEE ARTHROPLASTY Right 8/21/2023    Right total knee arthroplasty performed by Chucky Garcia MD at [address] History     Socioeconomic History    Marital status:      Spouse name: None    Number of children: None    Years of education: None    Highest education level: None   Tobacco Use    Smoking status: Never    Smokeless tobacco: Never   Substance and Sexual Activity    Alcohol use: Yes     Alcohol/week: 2.0 standard drinks of alcohol     Types: 1 Cans of beer, 1 Shots of liquor per week    Drug use: Never     History reviewed. No pertinent family history. Current Outpatient Medications   Medication Sig Dispense Refill    senna (SENOKOT) 8.6 MG tablet Take 1 tablet by mouth daily      ibuprofen (ADVIL;MOTRIN) 800 MG tablet Take 1 tablet by mouth every 8 hours as needed for Pain 60 tablet 0    tiZANidine (ZANAFLEX) 4 MG tablet Take 1 tablet by mouth 2 times daily as needed (Muscle spasms) 30 tablet 0    aspirin 81 MG EC tablet Take 1 tablet by mouth 2 times daily (Patient taking differently: Take 1 tablet by mouth daily) 60 tablet 0    Misc. Devices (CLASSICS ROLLING WALKER) MISC Dispense 1 rolling walker 1 each 0    Misc. Devices (BATH/SHOWER SEAT) MISC Dispense 1 Shower/Bath seat 1 each 0    Misc.  Devices (RAISED TOILET SEAT) MISC Dispense 1 raised toilet seat 1 each 0

## 2023-10-12 ENCOUNTER — HOSPITAL ENCOUNTER (OUTPATIENT)
Dept: PHYSICAL THERAPY | Age: 58
Setting detail: THERAPIES SERIES
Discharge: HOME | End: 2023-10-12
Payer: COMMERCIAL

## 2023-10-12 PROCEDURE — 97110 THERAPEUTIC EXERCISES: CPT

## 2023-10-12 ASSESSMENT — PAIN DESCRIPTION - ORIENTATION: ORIENTATION: RIGHT

## 2023-10-12 ASSESSMENT — PAIN DESCRIPTION - LOCATION: LOCATION: KNEE

## 2023-10-12 ASSESSMENT — PAIN SCALES - GENERAL: PAINLEVEL_OUTOF10: 2

## 2023-10-12 NOTE — PROGRESS NOTES
[facility]  Outpatient Physical Therapy    Treatment Note        Date: 10/12/2023  Patient: Letty Wadsworth  : 1965   Confirmed: Yes  MRN: [id number]  Referring Provider: VIVIAN Smith    Medical Diagnosis: S/P total knee arthroplasty, right [Z96.651]       Treatment Diagnosis: R knee pain, decreased R knee ROM and strength, impaired balance, impaired gait and functional mobility    Visit Information:  Insurance: Payor: [employer] / Plan: MEDICAL MUTUAL ACCESS / Product Type: *No Product type* /   PT Visit Information  Onset Date: 23  Total # of Visits Approved: 99  Total # of Visits to Date: 9  No Show: 0  Canceled Appointment: 0  Progress Note Counter:     Subjective Information:  Subjective: Pt reports his pain levels continue to improve. Pt states night time he has most discomfort. Pt reports using cane only PRN.   HEP Compliance:  [x] Good [] Fair [] Poor [] Reports not doing due to:    Pain Screening  Patient Currently in Pain: Yes  Pain Assessment: 0-10  Pain Level: 2  Pain Location: Knee  Pain Orientation: Right    Treatment:  Exercises:  Exercises  Exercise 1: bike star trac: 6 minutes, level 3, S6  Exercise 2: standing gastroc stretches with SB: 30\" x 5 radha  Exercise 3: supine HS stretch with strap: 30\" x 5 RLE  Exercise 4: Mod Toño stretch: 30\" x 5 RLE  Exercise 5: Lateral Carlos step-overs / Foward Carlos step-overs: 12\" x 5-8 laps each   Treatment Reasoning  Limitations addressed: Flexibility, Mobility, Balance, Proprioception    Modalities:  Cryotherapy (CPT 56358)  Patient Position: Supine (LE elevated on wedge)  Number Minutes Cryotherapy: 10  Cryotherapy location: Right, Knee  Post treatment skin assessment: Intact  Limitations addressed: Pain modulation, Edema       *Indicates exercise, modality, or manual techniques to be initiated when appropriate    Objective Measures:          AROM RLE (degrees)  RLE General AROM: knee: -5 to 110;

## 2023-10-16 ENCOUNTER — HOSPITAL ENCOUNTER (OUTPATIENT)
Dept: PHYSICAL THERAPY | Age: 58
Setting detail: THERAPIES SERIES
Discharge: HOME | End: 2023-10-16
Payer: COMMERCIAL

## 2023-10-16 PROCEDURE — 97110 THERAPEUTIC EXERCISES: CPT

## 2023-10-16 ASSESSMENT — PAIN DESCRIPTION - DESCRIPTORS: DESCRIPTORS: HEAVINESS

## 2023-10-16 ASSESSMENT — PAIN DESCRIPTION - LOCATION: LOCATION: KNEE

## 2023-10-16 ASSESSMENT — PAIN DESCRIPTION - ORIENTATION: ORIENTATION: RIGHT

## 2023-10-16 ASSESSMENT — PAIN SCALES - GENERAL: PAINLEVEL_OUTOF10: 0

## 2023-10-16 ASSESSMENT — PAIN DESCRIPTION - PAIN TYPE: TYPE: SURGICAL PAIN

## 2023-10-16 NOTE — PROGRESS NOTES
[facility]  Outpatient Physical Therapy    Treatment Note        Date: 10/16/2023  Patient: Santosh Damon  : 1965   Confirmed: Yes  MRN: [id number]  Referring Provider: VIVIAN Mulligan    Medical Diagnosis: S/P total knee arthroplasty, right [Z96.651]       Treatment Diagnosis: R knee pain, decreased R knee ROM and strength, impaired balance, impaired gait and functional mobility    Visit Information:  Insurance: Payor: MED[employer] / Plan: MEDICAL MUTUAL ACCESS / Product Type: *No Product type* /   PT Visit Information  Onset Date: 23  Total # of Visits Approved: 99  Total # of Visits to Date: 10  No Show: 0  Canceled Appointment: 0  Progress Note Counter: 10/12    Subjective Information:  Subjective: Pt reports his knee has been better since last visit. HEP Compliance:  [x] Good [] Fair [] Poor [] Reports not doing due to:    Pain Screening  Patient Currently in Pain: Yes  Pain Assessment: 0-10  Pain Level: 0  Pain Type: Surgical pain  Pain Location: Knee  Pain Orientation: Right  Pain Descriptors: Heaviness    Treatment:  Exercises:  Exercises  Exercise 1: Treadmill x 3 minutes retro . 6 -1 mph  Exercise 2: standing gastroc stretches with SB: 30\" x 4 radha  Exercise 3: supine HS stretch with strap: 30\" x 4 RLE  Exercise 4: Mod Toño stretch: 30\" x 5 RLE  Exercise 5: Step Ups x 10 radha (6\" step)  Exercise 6: Tap downs x 10 (4\" step)  Exercise 7: Elliptical x 4 minutes - retro  Exercise 20: HEP: knee ROM, modified toño stretch (verbal)       Manual:   Manual Therapy  Soft Tissue Mobilizaton: R distal quad x 5 minutes       Modalities:  Cryotherapy (CPT 70568)  Patient Position: Supine (LE elevated on wedge)  Number Minutes Cryotherapy: 10  Cryotherapy location: Right, Knee  Post treatment skin assessment: Intact  Limitations addressed: Pain modulation, Edema       *Indicates exercise, modality, or manual techniques to be initiated when appropriate    Objective

## 2023-10-17 ENCOUNTER — OFFICE VISIT (OUTPATIENT)
Dept: ORTHOPEDIC SURGERY | Age: 58
End: 2023-10-17

## 2023-10-17 VITALS
TEMPERATURE: 97.6 F | DIASTOLIC BLOOD PRESSURE: 73 MMHG | SYSTOLIC BLOOD PRESSURE: 117 MMHG | HEIGHT: 71 IN | OXYGEN SATURATION: 98 % | HEART RATE: 77 BPM | WEIGHT: 243 LBS | BODY MASS INDEX: 34.02 KG/M2

## 2023-10-17 DIAGNOSIS — Z96.651 S/P TOTAL KNEE ARTHROPLASTY, RIGHT: Primary | ICD-10-CM

## 2023-10-17 PROCEDURE — 99024 POSTOP FOLLOW-UP VISIT: CPT | Performed by: PHYSICIAN ASSISTANT

## 2023-10-17 NOTE — PROGRESS NOTES
Narrative Referring Provider:   Aura Sinha      PCP:   Raz Rosas MD    ============================  IMPRESSION/PLAN:  ============================  Glojordana Macdonald is s/p right Total knee replacement completed on 21st May 20. IMPRESSION: At normal postoperative stage of recovery    PLAN:  Continue current conservative treatment. , Rest, Ice, Compression, Elevation PRN. , and Continue physical therapy. Routine follow-up. Ice compression and elevation  Patient Reassurance: Normal post-operative course discussed with patient. Patient reassured and supported. All questions answered. Follow up 6-week no X-Rays Needed    Patient presents today for a a early post-op visit    Status post op:  right Total knee replacement     BMI: There is no height or weight on file to calculate BMI. Post-operative recovery was complicated by uneventful/none. Patient rates their condition as improving. Does the patient still experience pain? 2/10 pain, aching    Post Op discharge patient location: in home. Functional Assessment is as follows: is ready to begin outpatient PT. Functional difficulties: None. Pain Medication: Tylenol, rare oxycodone  Currently Ambulating with: a cane    =================================  EXAM: POST OP KNEE  =================================  Right Post-Operative Knee    Ambulates with a limp favoring the right. SKIN: Appropriate postop appearance, No evidence of erythema, warmth, discharge or drainage and Incision clean/dry/intact. Range of motion is 0 degrees in extension and    degrees of flexion. Extension La degrees    Pain with ROM: Yes with deeper flexion    There is Mild effusion.      Mal-alignment: No    Tender to the palpation of Medial femoral condyle and Medial joint line    Neurovascular Status: Sensation Intact, Moves foot and ankle up & down, 2+ dorsalis pedis and negative homans sign    Stability:Varus/Valgus- Yes, stable    Quad strength:

## 2023-10-19 ENCOUNTER — HOSPITAL ENCOUNTER (OUTPATIENT)
Dept: PHYSICAL THERAPY | Age: 58
Setting detail: THERAPIES SERIES
Discharge: HOME | End: 2023-10-19
Payer: COMMERCIAL

## 2023-10-19 PROCEDURE — 97110 THERAPEUTIC EXERCISES: CPT

## 2023-10-19 ASSESSMENT — PAIN SCALES - GENERAL: PAINLEVEL_OUTOF10: 2

## 2023-10-19 ASSESSMENT — PAIN DESCRIPTION - DESCRIPTORS: DESCRIPTORS: SORE

## 2023-10-19 ASSESSMENT — PAIN DESCRIPTION - PAIN TYPE: TYPE: SURGICAL PAIN

## 2023-10-19 ASSESSMENT — PAIN DESCRIPTION - LOCATION: LOCATION: KNEE

## 2023-10-19 ASSESSMENT — PAIN DESCRIPTION - ORIENTATION: ORIENTATION: RIGHT

## 2023-10-19 NOTE — PROGRESS NOTES
[facility]  Outpatient Physical Therapy    Treatment Note        Date: 10/19/2023  Patient: Jethro Senior  : 1965   Confirmed: Yes  MRN: [id number]  Referring Provider: VIVIAN Ott    Medical Diagnosis: S/P total knee arthroplasty, right [Z96.651]       Treatment Diagnosis: R knee pain, decreased R knee ROM and strength, impaired balance, impaired gait and functional mobility    Visit Information:  Insurance: Payor: [employer] / Plan: MEDICAL MUTUAL ACCESS / Product Type: *No Product type* /   PT Visit Information  Onset Date: 23  Total # of Visits Approved: 99  Total # of Visits to Date:   No Show: 0  Canceled Appointment: 0  Progress Note Counter:     Subjective Information:  Subjective: Pt reports he recent did some [place] trail walking, pt states he used his cane for added stability. Pt says his R hip is starting to get sore. HEP Compliance:  [x] Good [] Fair [] Poor [] Reports not doing due to:    Pain Screening  Patient Currently in Pain: Yes  Pain Assessment: 0-10  Pain Level: 2  Pain Type: Surgical pain  Pain Location: Knee  Pain Orientation: Right  Pain Descriptors: Sore    Treatment:  Exercises:  Exercises  Exercise 1: treadmill: fwd 2.0 x 8 min fwd  Exercise 2: treadmill: retro 1.5 x 7 min  Exercise 3: gastroc stretches with SB: x 3 min radha  Exercise 4: 1/2 kneeling R soleus stretch with SB: x 3 min  Exercise 5: reviewed SAQs with bolster and DKTC flexion stretches w/ p-ball  Treatment Reasoning  Limitations addressed: Flexibility, Proprioception, Activity tolerance    *Indicates exercise, modality, or manual techniques to be initiated when appropriate    Objective Measures:   R knee: 5 - 118 deg    Assessment:    Body Structures, Functions, Activity Limitations Requiring Skilled Therapeutic Intervention: Increased pain, Decreased ROM, Decreased strength, Decreased functional mobility , Decreased ADL status, Decreased high-level IADLs,

## 2023-10-23 ENCOUNTER — HOSPITAL ENCOUNTER (OUTPATIENT)
Dept: PHYSICAL THERAPY | Age: 58
Setting detail: THERAPIES SERIES
Discharge: HOME | End: 2023-10-23
Payer: COMMERCIAL

## 2023-10-23 PROCEDURE — 97110 THERAPEUTIC EXERCISES: CPT

## 2023-10-23 PROCEDURE — 97140 MANUAL THERAPY 1/> REGIONS: CPT

## 2023-10-23 ASSESSMENT — PAIN SCALES - GENERAL: PAINLEVEL_OUTOF10: 2

## 2023-10-23 NOTE — PROGRESS NOTES
[facility]  PHYSICAL THERAPY PLAN OF CARE   [address] [facility] [address], [address] [facility]         Phone: [phone]  Fax: [phone]    [] Certification  [] Recertification []  Plan of Care  [x] Progress Note [] Discharge      Referring Provider: VIVIAN Garcia     From:  Russ Harada, PT, DPT  Patient: Maylin Daugherty (41 y.o. male) : 1965 Date: 10/23/2023  Medical Diagnosis: S/P total knee arthroplasty, right [Z96.651]       Treatment Diagnosis: R knee pain, decreased R knee ROM and strength, impaired balance, impaired gait and functional mobility    Plan of Care/Certification Expiration Date:     Progress Report Period from:   2023 to 10/23/2023    Visits to Date: 12 No Show: 0 Cancelled Appts: 0    OBJECTIVE:   Short Term Goals - Time Frame for Short Term Goals: 4 weeks    Goals Current/Discharge status  Status   Short Term Goal 1: R knee PROM consistently 0-115 deg  STG 1 Current Status[de-identified] 10/23/23: 120* AROM   Met   Short Term Goal 2: SLS balance > 20\" elsie with improved ankle and hip strategies  STG 2 Current Status[de-identified] 10/23/23: >22\" ELSIE no UE support   Met     Long Term Goals - Time Frame for Long Term Goals : 6-8 weeks  Goals Current/ Discharge status Status   Long Term Goal 1: R knee AROM consistently 0-120+ deg to normalize functional movement patterns LTG 1 Current Status[de-identified] 10/23/23: R knee AROM 0-120* (painful)   In progress   Long Term Goal 2: LE strength 5/5 with ability to perform full knee AROM and reciprocal stair negotiaion for at least 1 flight of steps LTG 2 Current Status[de-identified] 10/23/23: MMT not taken d/t clinical oversight, pt making gains towards full AROM   In progress   Long Term Goal 3: Gait unrestricted community distances with normalized gait mechanics and symmetrical step length LTG 3 Current Status[de-identified] 10/23/23: Pt self reports standing and waling while performing yard work for 2-3 hours   In progress   Long Term Goal 4: 90% PLOF or better via
to perform full knee AROM and reciprocal stair negotiaion for at least 1 flight of steps In progress   LTG 3 Gait unrestricted community distances with normalized gait mechanics and symmetrical step length In progress   LTG 4 90% PLOF or better via functional report or outcome survey In progress   LTG 5 Independent with recommended HEP In progress          Plan:  Frequency/Duration:  Plan  Plan Frequency: 2  Plan weeks: 6-8  Current Treatment Recommendations: ROM, Strengthening, Balance training, Functional mobility training, Gait training, Stair training, Neuromuscular re-education, Manual, Pain management, Return to work related activity, Home exercise program, Safety education & training, Patient/Caregiver education & training, Equipment evaluation, education, & procurement, Modalities, Therapeutic activities  Modalities: Heat/Cold, Vasopneumatic Device  Pt to continue current HEP. See objective section for any therapeutic exercise changes, additions or modifications this date.     Therapy Time:      PT Individual Minutes  Time In: 3517  Time Out: 5865  Minutes: 55  Timed Code Treatment Minutes: 45 Minutes  Procedure Minutes: 10 minutes CP  Timed Activity Minutes Units   Ther Ex 32 2   Manual (obj measures) 13 1     Electronically signed by Tigre Veras PTA on 10/23/23 at 8:45 AM EDT

## 2023-10-26 ENCOUNTER — HOSPITAL ENCOUNTER (OUTPATIENT)
Dept: PHYSICAL THERAPY | Age: 58
Setting detail: THERAPIES SERIES
Discharge: HOME | End: 2023-10-26
Payer: COMMERCIAL

## 2023-10-26 PROCEDURE — 97110 THERAPEUTIC EXERCISES: CPT

## 2023-10-26 ASSESSMENT — PAIN SCALES - GENERAL: PAINLEVEL_OUTOF10: 2

## 2023-10-26 ASSESSMENT — PAIN DESCRIPTION - PAIN TYPE: TYPE: SURGICAL PAIN

## 2023-10-26 ASSESSMENT — PAIN DESCRIPTION - DESCRIPTORS: DESCRIPTORS: SORE

## 2023-10-26 ASSESSMENT — PAIN DESCRIPTION - ORIENTATION: ORIENTATION: RIGHT

## 2023-10-26 ASSESSMENT — PAIN DESCRIPTION - LOCATION: LOCATION: KNEE

## 2023-10-26 NOTE — PROGRESS NOTES
to community level ambulation with good results. Shonda Do was able to tolerate with transient increases in hip pain which would alleviate with rest. geoffrey continues to present with subtle/ mild toe in on the surgical limb and reported hip instability at times this was not reproduced during tx this date. Pain did not incease with tx. Treatment Diagnosis: R knee pain, decreased R knee ROM and strength, impaired balance, impaired gait and functional mobility  Therapy Prognosis: Excellent       Patient Education: [x] NA       Post-Pain Assessment:       Pain Rating (0-10 pain scale):  2 /10   Location and pain description same as pre-treatment unless indicated.    Action: [] NA   [x] Perform HEP  [] Meds as prescribed  [] Modalities as prescribed   [] Call Physician     GOALS   Patient Goal(s): Patient Goals : make leg stronger, return to PLOF    Short Term Goals Completed by 4 weeks Goal Status   STG 1 R knee PROM consistently 0-115 deg Met   STG 2 SLS balance > 20\" radha with improved ankle and hip strategies Met        Long Term Goals Completed by 6-8 weeks Goal Status   LTG 1 R knee AROM consistently 0-120+ deg to normalize functional movement patterns In progress   LTG 2 LE strength 5/5 with ability to perform full knee AROM and reciprocal stair negotiaion for at least 1 flight of steps In progress   LTG 3 Gait unrestricted community distances with normalized gait mechanics and symmetrical step length In progress   LTG 4 90% PLOF or better via functional report or outcome survey In progress   LTG 5 Independent with recommended HEP In progress               Plan:  Frequency/Duration:  Plan  Plan Frequency: 2  Plan weeks: 6-8  Current Treatment Recommendations: ROM, Strengthening, Balance training, Functional mobility training, Gait training, Stair training, Neuromuscular re-education, Manual, Pain management, Return to work related activity, Home exercise program, Safety education & training, Patient/Caregiver education

## 2023-10-30 ENCOUNTER — HOSPITAL ENCOUNTER (OUTPATIENT)
Dept: PHYSICAL THERAPY | Age: 58
Setting detail: THERAPIES SERIES
Discharge: HOME | End: 2023-10-30
Payer: COMMERCIAL

## 2023-10-30 PROCEDURE — 97110 THERAPEUTIC EXERCISES: CPT

## 2023-10-30 ASSESSMENT — PAIN DESCRIPTION - PAIN TYPE: TYPE: SURGICAL PAIN

## 2023-10-30 ASSESSMENT — PAIN DESCRIPTION - ORIENTATION: ORIENTATION: RIGHT

## 2023-10-30 ASSESSMENT — PAIN DESCRIPTION - DESCRIPTORS: DESCRIPTORS: TIGHTNESS

## 2023-10-30 ASSESSMENT — PAIN SCALES - GENERAL: PAINLEVEL_OUTOF10: 2

## 2023-10-30 ASSESSMENT — PAIN DESCRIPTION - LOCATION: LOCATION: KNEE

## 2023-10-30 NOTE — PROGRESS NOTES
progress            Plan:  Frequency/Duration:  Plan  Plan Frequency: 2  Plan weeks: 6-8  Current Treatment Recommendations: ROM, Strengthening, Balance training, Functional mobility training, Gait training, Stair training, Neuromuscular re-education, Manual, Pain management, Return to work related activity, Home exercise program, Safety education & training, Patient/Caregiver education & training, Equipment evaluation, education, & procurement, Modalities, Therapeutic activities  Modalities: Heat/Cold, Vasopneumatic Device  Pt to continue current HEP. See objective section for any therapeutic exercise changes, additions or modifications this date.     Therapy Time:      PT Individual Minutes  Time In: 0930  Time Out: 3508  Minutes: 52  Timed Code Treatment Minutes: 42 Minutes  Procedure Minutes: 10 CP  Timed Activity Minutes Units   Ther Ex 42 3     Electronically signed by Troy Guaman PTA on 10/30/23 at 9:47 AM EDT

## 2023-11-02 ENCOUNTER — HOSPITAL ENCOUNTER (OUTPATIENT)
Dept: PHYSICAL THERAPY | Age: 58
Setting detail: THERAPIES SERIES
Discharge: HOME | End: 2023-11-02
Payer: COMMERCIAL

## 2023-11-02 PROCEDURE — 97110 THERAPEUTIC EXERCISES: CPT

## 2023-11-02 ASSESSMENT — PAIN SCALES - GENERAL: PAINLEVEL_OUTOF10: 2

## 2023-11-02 ASSESSMENT — PAIN DESCRIPTION - PAIN TYPE: TYPE: SURGICAL PAIN

## 2023-11-02 ASSESSMENT — PAIN DESCRIPTION - LOCATION: LOCATION: KNEE

## 2023-11-02 ASSESSMENT — PAIN DESCRIPTION - DESCRIPTORS: DESCRIPTORS: TIGHTNESS;ACHING

## 2023-11-02 ASSESSMENT — PAIN DESCRIPTION - ORIENTATION: ORIENTATION: RIGHT

## 2023-11-02 NOTE — PROGRESS NOTES
[address] [facility]  Outpatient Physical Therapy    Treatment Note        Date: 2023  Patient: Maylin Daugherty  : 1965   Confirmed: Yes  MRN: [id number]  Referring Provider: VIVIAN Garcia    Medical Diagnosis: S/P total knee arthroplasty, right [Z96.651]       Treatment Diagnosis: R knee pain, decreased R knee ROM and strength, impaired balance, impaired gait and functional mobility    Visit Information:  Insurance: Payor: [employer] / Plan: [employer] ACCESS / Product Type: *No Product type* /   PT Visit Information  Onset Date: 23  Total # of Visits Approved: 99  Total # of Visits to Date: 15  No Show: 0  Canceled Appointment: 0  Progress Note Counter: 15/16    Subjective Information:  Subjective: Pt arrives 8 minutes late to therapy session this date. Pt reports his dog got away from him and had to run a little. Says he was also up and down stairs \"about 20 times yesterday. It still feels heavy. \"  HEP Compliance:  [x] Good [] Fair [] Poor [] Reports not doing due to:    Pain Screening  Patient Currently in Pain: Yes  Pain Assessment: 0-10  Pain Level: 2  Pain Type: Surgical pain  Pain Location: Knee  Pain Orientation: Right  Pain Descriptors: Tightness, Aching    Treatment:  Exercises:  Exercises  Exercise 1: treadmill: fwd 2.0 x 5 min fwd  Exercise 2: treadmill: retro 1.7 x 5 min  Exercise 3: 4 way hip GTB x 10 radha  Exercise 4: Carlos Step Overs (12 in step overs) 3 sets x 6 hurdles (F/L)  Exercise 20: HEP: cont current       Modalities:  Cryotherapy (CPT 24765)  Patient Position: Supine (LE elevated on wedge)  Number Minutes Cryotherapy: 10  Cryotherapy location: Right, Knee  Post treatment skin assessment: Intact  Limitations addressed: Pain modulation, Edema       *Indicates exercise, modality, or manual techniques to be initiated when appropriate    Objective Measures:        LTG 1 Current Status[de-identified] 23: R knee AROM 0-124*        Assessment:    Body

## 2023-11-06 ENCOUNTER — HOSPITAL ENCOUNTER (OUTPATIENT)
Dept: PHYSICAL THERAPY | Age: 58
Setting detail: THERAPIES SERIES
Discharge: HOME | End: 2023-11-06
Payer: COMMERCIAL

## 2023-11-06 PROCEDURE — 97110 THERAPEUTIC EXERCISES: CPT

## 2023-11-06 ASSESSMENT — PAIN SCALES - GENERAL: PAINLEVEL_OUTOF10: 2

## 2023-11-06 ASSESSMENT — PAIN DESCRIPTION - PAIN TYPE: TYPE: SURGICAL PAIN

## 2023-11-06 ASSESSMENT — PAIN DESCRIPTION - DESCRIPTORS: DESCRIPTORS: HEAVINESS

## 2023-11-06 ASSESSMENT — PAIN DESCRIPTION - LOCATION: LOCATION: KNEE

## 2023-11-06 ASSESSMENT — PAIN DESCRIPTION - ORIENTATION: ORIENTATION: RIGHT

## 2023-11-07 NOTE — PROGRESS NOTES
[address] [facility]  PHYSICAL THERAPY PLAN OF CARE   [facility] [place], [address] [facility]         Phone: [phone]  Fax: [phone]    [] Certification  [] Recertification []  Plan of Care  [x] Progress Note [] Discharge      Referring Provider: VIVIAN Smith     From:  Darlyn Ch, PT,DPT  Patient: Letty Wadsworth (78 y.o. male) : 1965 Date: 2023  Medical Diagnosis: S/P total knee arthroplasty, right [Z96.651]       Treatment Diagnosis: R knee pain, decreased R knee ROM and strength, impaired balance, impaired gait and functional mobility    Progress Report Period from:  23  to 2023    Visits to Date: 16 No Show: 0 Cancelled Appts: 0    OBJECTIVE:   Short Term Goals - Time Frame for Short Term Goals: 4 weeks    Goals Current/Discharge status  Status   Short Term Goal 1: R knee PROM consistently 0-115 deg  STG 1 Current Status[de-identified] 23: PROM 0-122*   Met   Short Term Goal 2: SLS balance > 20\" elsie with improved ankle and hip strategies     STG 2 Current Status: 10/23/23: >22\" ELSIE no UE support Met   Long Term Goals - Time Frame for Long Term Goals : 6-8 weeks  Goals Current/ Discharge status Status   Long Term Goal 1: R knee AROM consistently 0-120+ deg to normalize functional movement patterns LTG 1 Current Status[de-identified] 23: R knee PROM 0-122*/ AROM: 0-125*   Met   Long Term Goal 2: LE strength 5/5 with ability to perform full knee AROM and reciprocal stair negotiaion for at least 1 flight of steps LTG 2 Current Status[de-identified] 23: Able to ascend and descend a full flight of steps without hand rails.    In progress, Partially met   Long Term Goal 3: Gait unrestricted community distances with normalized gait mechanics and symmetrical step length LTG 3 Current Status[de-identified] 23: Demonstrates normalizing gait mechanics and symmetrical step length when ambulating   In progress   Long Term Goal 4: 90% PLOF or better via functional report or outcome survey LTG
compliance with HEP and recent progressions. Assessment: Body Structures, Functions, Activity Limitations Requiring Skilled Therapeutic Intervention: Increased pain, Decreased ROM, Decreased strength, Decreased functional mobility , Decreased ADL status, Decreased high-level IADLs, Decreased balance  Assessment: Pt progressing well towards all goals this date. Pt demonstrates symmetical P/AROM in R knee, measuring 122 deg and 125 deg respectively. Displays improvements in stair ascending and descending with reduced compensations with R LE and no need for UE support. Pt reports desire to return to a fitness center after discharge. Plan to introduce exercises patient can utilize with a fitness center to continue strengthening over the 2 scheduled visits. Treatment Diagnosis: R knee pain, decreased R knee ROM and strength, impaired balance, impaired gait and functional mobility  Therapy Prognosis: Excellent         Post-Pain Assessment:       Pain Rating (0-10 pain scale):  0 /10   Location and pain description same as pre-treatment unless indicated.    Action: [] NA   [x] Perform HEP  [] Meds as prescribed  [] Modalities as prescribed   [] Call Physician     GOALS   Patient Goal(s): Patient Goals : make leg stronger, return to PLOF    Short Term Goals Completed by 4 weeks Goal Status   STG 1 R knee PROM consistently 0-115 deg Met   STG 2 SLS balance > 20\" radha with improved ankle and hip strategies Met     Long Term Goals Completed by 6-8 weeks Goal Status   LTG 1 R knee AROM consistently 0-120+ deg to normalize functional movement patterns Met   LTG 2 LE strength 5/5 with ability to perform full knee AROM and reciprocal stair negotiaion for at least 1 flight of steps In progress, Partially met   LTG 3 Gait unrestricted community distances with normalized gait mechanics and symmetrical step length In progress   LTG 4 90% PLOF or better via functional report or outcome survey In progress   LTG 5 Independent

## 2023-11-09 ENCOUNTER — HOSPITAL ENCOUNTER (OUTPATIENT)
Dept: PHYSICAL THERAPY | Age: 58
Setting detail: THERAPIES SERIES
Discharge: HOME | End: 2023-11-09
Payer: COMMERCIAL

## 2023-11-09 PROCEDURE — 97110 THERAPEUTIC EXERCISES: CPT

## 2023-11-09 ASSESSMENT — PAIN DESCRIPTION - LOCATION: LOCATION: KNEE

## 2023-11-09 ASSESSMENT — PAIN SCALES - GENERAL: PAINLEVEL_OUTOF10: 3

## 2023-11-09 ASSESSMENT — PAIN DESCRIPTION - DESCRIPTORS: DESCRIPTORS: SORE;HEAVINESS

## 2023-11-09 ASSESSMENT — PAIN DESCRIPTION - ORIENTATION: ORIENTATION: RIGHT

## 2023-11-09 ASSESSMENT — PAIN DESCRIPTION - PAIN TYPE: TYPE: SURGICAL PAIN

## 2023-11-09 NOTE — PROGRESS NOTES
[address] [facility]  Outpatient Physical Therapy    Treatment Note        Date: 2023  Patient: Antoine Cuba  : 1965   Confirmed: Yes  MRN: [id number]  Referring Provider: VIVIAN Mederos    Medical Diagnosis: S/P total knee arthroplasty, right [Z96.651]       Treatment Diagnosis: R knee pain, decreased R knee ROM and strength, impaired balance, impaired gait and functional mobility    Visit Information:  Insurance: Payor: [employer] / Plan: [employer] ACCESS / Product Type: *No Product type* /   PT Visit Information  Onset Date: 23  Total # of Visits Approved: 99  Total # of Visits to Date: 17  No Show: 0  Canceled Appointment: 0  Progress Note Counter: /2-4    Subjective Information:  Subjective: I didn't take a pain pill this morning. HEP Compliance:  [x] Good [] Fair [] Poor [] Reports not doing due to:    Pain Screening  Patient Currently in Pain: Yes  Pain Level: 3  Pain Type: Surgical pain  Pain Location: Knee  Pain Orientation: Right  Pain Descriptors: Sore, Heaviness    Treatment:  Exercises:  Exercises  Exercise 1: StarTrac x3.5 minutes Seat:7 Level: 4  Exercise 2: glute dominant squat/ butt taps with clincian HHA for to accomodate weight shifting posteriorly  Exercise 5: Leg press: unilateral #10 x 10 x 3 radha  Exercise 6: leg curls/extension #30 x 10 x 2, #10 x 10 x 3  Exercise 7: eccentric lowering 4\" stair x 10 x 2  Exercise 20: HEP: cont current        *Indicates exercise, modality, or manual techniques to be initiated when appropriate    Objective Measures:      LTG 5 Current Status[de-identified] 23: Progressed at home activities to include gym based activities in Preparation for progressions post DC          Assessment:    Body Structures, Functions, Activity Limitations Requiring Skilled Therapeutic Intervention: Increased pain, Decreased ROM, Decreased strength, Decreased functional mobility , Decreased ADL status, Decreased high-level IADLs,

## 2023-11-13 ENCOUNTER — OFFICE VISIT (OUTPATIENT)
Dept: UROLOGY | Age: 58
End: 2023-11-13

## 2023-11-13 ENCOUNTER — HOSPITAL ENCOUNTER (OUTPATIENT)
Dept: PHYSICAL THERAPY | Age: 58
Setting detail: THERAPIES SERIES
Discharge: HOME | End: 2023-11-13
Payer: COMMERCIAL

## 2023-11-13 VITALS
HEART RATE: 77 BPM | WEIGHT: 238 LBS | BODY MASS INDEX: 33.32 KG/M2 | DIASTOLIC BLOOD PRESSURE: 76 MMHG | SYSTOLIC BLOOD PRESSURE: 124 MMHG | HEIGHT: 71 IN

## 2023-11-13 DIAGNOSIS — R35.0 FREQUENCY OF URINATION: ICD-10-CM

## 2023-11-13 DIAGNOSIS — N41.0 ACUTE PROSTATITIS: Primary | ICD-10-CM

## 2023-11-13 LAB
BILIRUBIN, POC: NORMAL
BLOOD URINE, POC: NORMAL
CLARITY, POC: CLEAR
COLOR, POC: YELLOW
GLUCOSE URINE, POC: NORMAL
KETONES, POC: NORMAL
LEUKOCYTE EST, POC: NORMAL
NITRITE, POC: NORMAL
PH, POC: 5.5
PROTEIN, POC: NORMAL
SPECIFIC GRAVITY, POC: >1.03
UROBILINOGEN, POC: 0.2

## 2023-11-13 PROCEDURE — 97110 THERAPEUTIC EXERCISES: CPT

## 2023-11-13 ASSESSMENT — ENCOUNTER SYMPTOMS: APNEA: 0

## 2023-11-13 NOTE — PROGRESS NOTES
[address] [facility]  PHYSICAL THERAPY PLAN OF CARE   [address] [facility] [place], [address] [facility]         Phone: [phone]  Fax: [phone]    [] Certification  [] Recertification []  Plan of Care  [] Progress Note [x] Discharge      Referring Provider: VIVIAN Sarmiento     From:  Javier Kevin, PT, DPT  Patient: Jazz Drew (74 y.o. male) : 1965 Date: 2023  Medical Diagnosis: S/P total knee arthroplasty, right [Z96.651]       Treatment Diagnosis: R knee pain, decreased R knee ROM and strength, impaired balance, impaired gait and functional mobility    Progress Report Period from:  2023  to 2023    Visits to Date: 18 No Show: 0 Cancelled Appts: 0    OBJECTIVE:   Short Term Goals - Time Frame for Short Term Goals: 4 weeks    Goals Current/Discharge status  Status   Short Term Goal 1: R knee PROM consistently 0-115 deg  STG 1 Current Status[de-identified] 23: PROM 0-122*   Met   Short Term Goal 2: SLS balance > 20\" elsie with improved ankle and hip strategies  STG 2 Current Status[de-identified] 10/23/23: >22\" ELSIE no UE support   Met     Long Term Goals - Time Frame for Long Term Goals : 6-8 weeks  Goals Current/ Discharge status Status   Long Term Goal 1: R knee AROM consistently 0-120+ deg to normalize functional movement patterns LTG 1 Current Status[de-identified] 23: R knee PROM 0-122*/ AROM: 0-125*   Met   Long Term Goal 2: LE strength 5/5 with ability to perform full knee AROM and reciprocal stair negotiaion for at least 1 flight of steps LTG 2 Current Status[de-identified] 23: 5/5 bilateral LE. Able to perform stairs without hand rails   Met   Long Term Goal 3: Gait unrestricted community distances with normalized gait mechanics and symmetrical step length LTG 3 Current Status[de-identified] 23: Demonstrates normalizing gait mechanics and symmetrical step length when ambulating. Able to perform outdoor activities with no pain noted.    Met   Long Term Goal 4: 90% PLOF or better via functional
mobility training, Gait training, Stair training, Neuromuscular re-education, Manual, Pain management, Return to work related activity, Home exercise program, Safety education & training, Patient/Caregiver education & training, Equipment evaluation, education, & procurement, Modalities, Therapeutic activities  Modalities: Heat/Cold, Vasopneumatic Device  Pt to continue current HEP. See objective section for any therapeutic exercise changes, additions or modifications this date.     Therapy Time:      PT Individual Minutes  Time In: 5156  Time Out: 3226  Minutes: 30  Timed Code Treatment Minutes: 30 Minutes  Procedure Minutes: 0  Timed Activity Minutes Units   Ther Ex 30 2   Electronically signed by Nii Hinds PTA on 11/13/23 at 9:25 AM EST

## 2023-11-13 NOTE — PROGRESS NOTES
doxycycline. He states that his symptoms have resolved. . he denies hematuria, dysuria and hematospermia. States he has only felt discomfort once, now it has resolved.        Plan:      Prn follow up  PSA per PCP        Myla Wilkins PA-C

## 2023-11-16 ENCOUNTER — OFFICE VISIT (OUTPATIENT)
Dept: ORTHOPEDIC SURGERY | Age: 58
End: 2023-11-16

## 2023-11-16 VITALS
HEIGHT: 71 IN | HEART RATE: 85 BPM | OXYGEN SATURATION: 98 % | WEIGHT: 238 LBS | BODY MASS INDEX: 33.32 KG/M2 | TEMPERATURE: 97.3 F

## 2023-11-16 DIAGNOSIS — Z96.651 PRESENCE OF TOTAL KNEE JOINT PROSTHESIS, RIGHT: Primary | ICD-10-CM

## 2023-11-16 PROCEDURE — 99024 POSTOP FOLLOW-UP VISIT: CPT | Performed by: ORTHOPAEDIC SURGERY

## 2023-11-16 RX ORDER — IBUPROFEN 800 MG/1
800 TABLET ORAL EVERY 8 HOURS PRN
Qty: 90 TABLET | Refills: 1 | Status: SHIPPED | OUTPATIENT
Start: 2023-11-16

## 2023-11-16 NOTE — PROGRESS NOTES
Narrative Referring Provider:   Camille Lesch      PCP:   Vanessa Aragon MD    ============================  IMPRESSION/PLAN:  ============================  Antoine Locket is s/p right Total knee replacement completed on 23. IMPRESSION: No complaints or limitations    PLAN:  No new treatment indicated. Routine follow-up. Ice compression and elevation  Patient Reassurance: Normal post-operative course discussed with patient. Patient reassured and supported. All questions answered. Follow up 2024 X-Rays Needed    Patient presents today for a a routine 1st post-op visit    Status post op:  right Total knee replacement     BMI: There is no height or weight on file to calculate BMI. Post-operative recovery was complicated by uneventful/none. Patient rates their condition as improving. Does the patient still experience pain? no    Post Op discharge patient location: in home. Functional Assessment is as follows: is ready to begin outpatient PT. Functional difficulties: None. Pain Medication: no  Currently Ambulating with:no assistive devices  =================================  EXAM: POST OP KNEE  =================================  Right Post-Operative Knee    Ambulates with a normal gait. SKIN: Appropriate postop appearance, No evidence of erythema, warmth, discharge or drainage and Incision clean/dry/intact. Range of motion is 0 degrees in extension and   120 degrees of flexion. Extension La degrees    Pain with ROM: no    There is Mild effusion.      Mal-alignment: No    No pain     Neurovascular Status: Sensation Intact, Moves foot and ankle up & down, 2+ dorsalis pedis and negative homans sign    Stability:Varus/Valgus- Yes, stable    Quad strength: improving    Imaging:  none    Provider:   Ruthie Quiñones MD

## 2024-09-12 RX ORDER — IBUPROFEN 800 MG/1
800 TABLET, FILM COATED ORAL EVERY 8 HOURS PRN
Qty: 90 TABLET | Refills: 1 | Status: SHIPPED | OUTPATIENT
Start: 2024-09-12